# Patient Record
Sex: MALE | Race: WHITE | NOT HISPANIC OR LATINO
[De-identification: names, ages, dates, MRNs, and addresses within clinical notes are randomized per-mention and may not be internally consistent; named-entity substitution may affect disease eponyms.]

---

## 2017-03-23 ENCOUNTER — APPOINTMENT (OUTPATIENT)
Dept: HEART AND VASCULAR | Facility: CLINIC | Age: 82
End: 2017-03-23

## 2017-03-23 VITALS
HEART RATE: 72 BPM | SYSTOLIC BLOOD PRESSURE: 123 MMHG | HEIGHT: 71 IN | DIASTOLIC BLOOD PRESSURE: 77 MMHG | BODY MASS INDEX: 32.2 KG/M2 | WEIGHT: 230 LBS

## 2017-09-19 ENCOUNTER — APPOINTMENT (OUTPATIENT)
Dept: HEART AND VASCULAR | Facility: CLINIC | Age: 82
End: 2017-09-19
Payer: MEDICARE

## 2017-09-19 VITALS
HEIGHT: 71 IN | HEART RATE: 79 BPM | SYSTOLIC BLOOD PRESSURE: 103 MMHG | DIASTOLIC BLOOD PRESSURE: 63 MMHG | BODY MASS INDEX: 32.2 KG/M2 | WEIGHT: 230 LBS

## 2017-09-19 PROCEDURE — 93280 PM DEVICE PROGR EVAL DUAL: CPT

## 2018-03-27 ENCOUNTER — APPOINTMENT (OUTPATIENT)
Dept: HEART AND VASCULAR | Facility: CLINIC | Age: 83
End: 2018-03-27
Payer: MEDICARE

## 2018-03-27 VITALS
HEART RATE: 70 BPM | WEIGHT: 225 LBS | BODY MASS INDEX: 31.5 KG/M2 | HEIGHT: 71 IN | DIASTOLIC BLOOD PRESSURE: 62 MMHG | SYSTOLIC BLOOD PRESSURE: 114 MMHG

## 2018-03-27 PROCEDURE — 93280 PM DEVICE PROGR EVAL DUAL: CPT

## 2018-03-27 PROCEDURE — 99214 OFFICE O/P EST MOD 30 MIN: CPT | Mod: 25

## 2018-09-05 ENCOUNTER — LABORATORY RESULT (OUTPATIENT)
Age: 83
End: 2018-09-05

## 2018-09-05 ENCOUNTER — APPOINTMENT (OUTPATIENT)
Dept: HEART AND VASCULAR | Facility: CLINIC | Age: 83
End: 2018-09-05
Payer: MEDICARE

## 2018-09-05 ENCOUNTER — APPOINTMENT (OUTPATIENT)
Dept: HEART AND VASCULAR | Facility: CLINIC | Age: 83
End: 2018-09-05

## 2018-09-05 VITALS
BODY MASS INDEX: 31.64 KG/M2 | DIASTOLIC BLOOD PRESSURE: 80 MMHG | RESPIRATION RATE: 16 BRPM | WEIGHT: 226 LBS | SYSTOLIC BLOOD PRESSURE: 130 MMHG | HEIGHT: 71 IN | HEART RATE: 66 BPM

## 2018-09-05 DIAGNOSIS — Z78.9 OTHER SPECIFIED HEALTH STATUS: ICD-10-CM

## 2018-09-05 DIAGNOSIS — Z82.49 FAMILY HISTORY OF ISCHEMIC HEART DISEASE AND OTHER DISEASES OF THE CIRCULATORY SYSTEM: ICD-10-CM

## 2018-09-05 DIAGNOSIS — Z86.718 PERSONAL HISTORY OF OTHER VENOUS THROMBOSIS AND EMBOLISM: ICD-10-CM

## 2018-09-05 DIAGNOSIS — Z80.42 FAMILY HISTORY OF MALIGNANT NEOPLASM OF PROSTATE: ICD-10-CM

## 2018-09-05 PROCEDURE — 99214 OFFICE O/P EST MOD 30 MIN: CPT | Mod: 25

## 2018-09-05 PROCEDURE — 93000 ELECTROCARDIOGRAM COMPLETE: CPT

## 2018-09-05 PROCEDURE — 36415 COLL VENOUS BLD VENIPUNCTURE: CPT

## 2018-09-06 LAB
ALBUMIN SERPL ELPH-MCNC: 4.2 G/DL
ALP BLD-CCNC: 46 U/L
ALT SERPL-CCNC: 12 U/L
ANION GAP SERPL CALC-SCNC: 13 MMOL/L
AST SERPL-CCNC: 16 U/L
BASOPHILS # BLD AUTO: 0.04 K/UL
BASOPHILS NFR BLD AUTO: 0.5 %
BILIRUB SERPL-MCNC: 0.8 MG/DL
BUN SERPL-MCNC: 22 MG/DL
CALCIUM SERPL-MCNC: 8.8 MG/DL
CHLORIDE SERPL-SCNC: 103 MMOL/L
CHOLEST SERPL-MCNC: 102 MG/DL
CHOLEST/HDLC SERPL: 2.4 RATIO
CO2 SERPL-SCNC: 25 MMOL/L
CREAT SERPL-MCNC: 1.1 MG/DL
EOSINOPHIL # BLD AUTO: 0.21 K/UL
EOSINOPHIL NFR BLD AUTO: 2.8 %
GLUCOSE SERPL-MCNC: 85 MG/DL
HBA1C MFR BLD HPLC: 5.8 %
HCT VFR BLD CALC: 38.4 %
HDLC SERPL-MCNC: 42 MG/DL
HGB BLD-MCNC: 11.8 G/DL
IMM GRANULOCYTES NFR BLD AUTO: 0.4 %
LDLC SERPL CALC-MCNC: 48 MG/DL
LYMPHOCYTES # BLD AUTO: 1.82 K/UL
LYMPHOCYTES NFR BLD AUTO: 24.5 %
MAN DIFF?: NORMAL
MCHC RBC-ENTMCNC: 29.2 PG
MCHC RBC-ENTMCNC: 30.7 GM/DL
MCV RBC AUTO: 95 FL
MONOCYTES # BLD AUTO: 1.05 K/UL
MONOCYTES NFR BLD AUTO: 14.1 %
NEUTROPHILS # BLD AUTO: 4.28 K/UL
NEUTROPHILS NFR BLD AUTO: 57.7 %
PLATELET # BLD AUTO: 216 K/UL
POTASSIUM SERPL-SCNC: 4.5 MMOL/L
PROT SERPL-MCNC: 6.5 G/DL
RBC # BLD: 4.04 M/UL
RBC # FLD: 15.6 %
SODIUM SERPL-SCNC: 141 MMOL/L
T3 SERPL-MCNC: 108 NG/DL
T3FREE SERPL-MCNC: 2.81 PG/ML
T3RU NFR SERPL: 0.96 INDEX
T4 FREE SERPL-MCNC: 1.4 NG/DL
T4 SERPL-MCNC: 8.4 UG/DL
TRIGL SERPL-MCNC: 60 MG/DL
TSH SERPL-ACNC: 1.37 UIU/ML
WBC # FLD AUTO: 7.43 K/UL

## 2018-09-08 PROBLEM — Z80.42 FAMILY HISTORY OF MALIGNANT NEOPLASM OF PROSTATE: Status: ACTIVE | Noted: 2018-09-08

## 2018-09-08 PROBLEM — Z78.9 CONSUMES ALCOHOL AT SOCIAL EVENTS: Status: ACTIVE | Noted: 2018-09-08

## 2018-09-08 PROBLEM — Z82.49 FAMILY HISTORY OF CORONARY ARTERY DISEASE: Status: ACTIVE | Noted: 2018-09-08

## 2018-09-08 PROBLEM — Z82.49 FAMILY HISTORY OF HYPERTENSION: Status: ACTIVE | Noted: 2018-09-08

## 2018-09-08 RX ORDER — CHROMIUM 200 MCG
1000 TABLET ORAL
Refills: 0 | Status: ACTIVE | COMMUNITY

## 2018-09-20 ENCOUNTER — APPOINTMENT (OUTPATIENT)
Dept: HEART AND VASCULAR | Facility: CLINIC | Age: 83
End: 2018-09-20

## 2018-09-25 ENCOUNTER — APPOINTMENT (OUTPATIENT)
Dept: HEART AND VASCULAR | Facility: CLINIC | Age: 83
End: 2018-09-25

## 2018-09-26 ENCOUNTER — APPOINTMENT (OUTPATIENT)
Dept: HEART AND VASCULAR | Facility: CLINIC | Age: 83
End: 2018-09-26

## 2018-10-23 ENCOUNTER — APPOINTMENT (OUTPATIENT)
Dept: HEART AND VASCULAR | Facility: CLINIC | Age: 83
End: 2018-10-23
Payer: MEDICARE

## 2018-10-23 VITALS
WEIGHT: 223 LBS | HEART RATE: 91 BPM | SYSTOLIC BLOOD PRESSURE: 109 MMHG | DIASTOLIC BLOOD PRESSURE: 58 MMHG | BODY MASS INDEX: 31.22 KG/M2 | HEIGHT: 71 IN

## 2018-10-23 PROCEDURE — 93280 PM DEVICE PROGR EVAL DUAL: CPT

## 2018-10-23 RX ORDER — AMOXICILLIN AND CLAVULANATE POTASSIUM 500; 125 MG/1; MG/1
500-125 TABLET, FILM COATED ORAL 3 TIMES DAILY
Qty: 30 | Refills: 0 | Status: DISCONTINUED | COMMUNITY
Start: 2018-09-05 | End: 2018-10-23

## 2018-12-10 ENCOUNTER — RX RENEWAL (OUTPATIENT)
Age: 83
End: 2018-12-10

## 2018-12-12 ENCOUNTER — RX RENEWAL (OUTPATIENT)
Age: 83
End: 2018-12-12

## 2018-12-13 ENCOUNTER — RX RENEWAL (OUTPATIENT)
Age: 83
End: 2018-12-13

## 2019-01-23 ENCOUNTER — LABORATORY RESULT (OUTPATIENT)
Age: 84
End: 2019-01-23

## 2019-01-23 ENCOUNTER — APPOINTMENT (OUTPATIENT)
Dept: HEART AND VASCULAR | Facility: CLINIC | Age: 84
End: 2019-01-23
Payer: MEDICARE

## 2019-01-23 VITALS
DIASTOLIC BLOOD PRESSURE: 66 MMHG | RESPIRATION RATE: 14 BRPM | HEIGHT: 71 IN | SYSTOLIC BLOOD PRESSURE: 110 MMHG | BODY MASS INDEX: 30.94 KG/M2 | HEART RATE: 60 BPM | WEIGHT: 221 LBS

## 2019-01-23 PROCEDURE — 93000 ELECTROCARDIOGRAM COMPLETE: CPT

## 2019-01-23 PROCEDURE — 36415 COLL VENOUS BLD VENIPUNCTURE: CPT

## 2019-01-23 PROCEDURE — 99214 OFFICE O/P EST MOD 30 MIN: CPT

## 2019-01-23 NOTE — REASON FOR VISIT
[Follow-Up - Clinic] : a clinic follow-up of [Atrial Fibrillation] : atrial fibrillation [Cardiomyopathy] : cardiomyopathy [Coronary Artery Disease] : coronary artery disease [Hypertension] : hypertension

## 2019-01-23 NOTE — DISCUSSION/SUMMARY
[Atrial Fibrillation] : atrial fibrillation [Cardiomyopathy] : cardiomyopathy [Coronary Artery Disease] : coronary artery disease [Dietary Modification] : dietary modification [Weight Reduction] : weight reduction [Hypertension] : hypertension [Stable] : stable [None] : none [Sodium Restriction] : sodium restriction [Patient] : the patient [de-identified] : in paced rhythm [FreeTextEntry1] : Blood work drawn. Maintain a low caloric, low sodium, low cholesterol  diet. Dietary counseling given, diet and exercise discussed in detail, weight loss recommended.\par

## 2019-01-23 NOTE — HISTORY OF PRESENT ILLNESS
[FreeTextEntry1] : Denies Chest Pain, SOB, Dizziness, Leg edema, Orthopnea, PND, Palpitations, Syncope, DEWEY, Diaphoresis, unchanged clinical status\par

## 2019-01-23 NOTE — PHYSICAL EXAM
[General Appearance - Well Developed] : well developed [Normal Appearance] : normal appearance [Well Groomed] : well groomed [General Appearance - Well Nourished] : well nourished [No Deformities] : no deformities [General Appearance - In No Acute Distress] : no acute distress [Normal Conjunctiva] : the conjunctiva exhibited no abnormalities [Eyelids - No Xanthelasma] : the eyelids demonstrated no xanthelasmas [Normal Jugular Venous A Waves Present] : normal jugular venous A waves present [Normal Jugular Venous V Waves Present] : normal jugular venous V waves present [No Jugular Venous Herbert A Waves] : no jugular venous herbert A waves [Respiration, Rhythm And Depth] : normal respiratory rhythm and effort [Exaggerated Use Of Accessory Muscles For Inspiration] : no accessory muscle use [Auscultation Breath Sounds / Voice Sounds] : lungs were clear to auscultation bilaterally [Heart Rate And Rhythm] : heart rate and rhythm were normal [Heart Sounds] : normal S1 and S2 [Systolic grade ___/6] : A grade [unfilled]/6 systolic murmur was heard. [Diastolic Grade ___/4] : A grade [unfilled]/4 diastolic murmur was heard. [Abdomen Soft] : soft [Abdomen Tenderness] : non-tender [Abdomen Mass (___ Cm)] : no abdominal mass palpated [Abnormal Walk] : normal gait [Gait - Sufficient For Exercise Testing] : the gait was sufficient for exercise testing [Nail Clubbing] : no clubbing of the fingernails [Cyanosis, Localized] : no localized cyanosis [Petechial Hemorrhages (___cm)] : no petechial hemorrhages [Skin Color & Pigmentation] : normal skin color and pigmentation [] : no rash [No Venous Stasis] : no venous stasis [Skin Lesions] : no skin lesions [No Skin Ulcers] : no skin ulcer [No Xanthoma] : no  xanthoma was observed [Oriented To Time, Place, And Person] : oriented to person, place, and time [Affect] : the affect was normal [Mood] : the mood was normal [No Anxiety] : not feeling anxious [Normal Oral Mucosa] : normal oral mucosa [No Oral Pallor] : no oral pallor [No Oral Cyanosis] : no oral cyanosis

## 2019-01-24 LAB
ALBUMIN SERPL ELPH-MCNC: 4.3 G/DL
ALP BLD-CCNC: 45 U/L
ALT SERPL-CCNC: 18 U/L
ANION GAP SERPL CALC-SCNC: 12 MMOL/L
AST SERPL-CCNC: 21 U/L
BILIRUB SERPL-MCNC: 0.8 MG/DL
BUN SERPL-MCNC: 21 MG/DL
CALCIUM SERPL-MCNC: 9.2 MG/DL
CHLORIDE SERPL-SCNC: 106 MMOL/L
CHOLEST SERPL-MCNC: 118 MG/DL
CHOLEST/HDLC SERPL: 2.2 RATIO
CO2 SERPL-SCNC: 24 MMOL/L
CREAT SERPL-MCNC: 0.98 MG/DL
GLUCOSE SERPL-MCNC: 84 MG/DL
HBA1C MFR BLD HPLC: 5.7 %
HDLC SERPL-MCNC: 54 MG/DL
LDLC SERPL CALC-MCNC: 50 MG/DL
POTASSIUM SERPL-SCNC: 4.1 MMOL/L
PROT SERPL-MCNC: 6.5 G/DL
SODIUM SERPL-SCNC: 143 MMOL/L
TRIGL SERPL-MCNC: 72 MG/DL

## 2019-01-27 ENCOUNTER — RX RENEWAL (OUTPATIENT)
Age: 84
End: 2019-01-27

## 2019-02-04 LAB
BASOPHILS # BLD AUTO: 0.03 K/UL
BASOPHILS NFR BLD AUTO: 0.5 %
EOSINOPHIL # BLD AUTO: 0.29 K/UL
EOSINOPHIL NFR BLD AUTO: 4.7 %
HCT VFR BLD CALC: 40.5 %
HGB BLD-MCNC: 12.3 G/DL
IMM GRANULOCYTES NFR BLD AUTO: 0.3 %
LYMPHOCYTES # BLD AUTO: 1.7 K/UL
LYMPHOCYTES NFR BLD AUTO: 27.3 %
MAN DIFF?: NORMAL
MCHC RBC-ENTMCNC: 28.2 PG
MCHC RBC-ENTMCNC: 30.4 GM/DL
MCV RBC AUTO: 92.9 FL
MONOCYTES # BLD AUTO: 0.82 K/UL
MONOCYTES NFR BLD AUTO: 13.2 %
NEUTROPHILS # BLD AUTO: 3.37 K/UL
NEUTROPHILS NFR BLD AUTO: 54 %
PLATELET # BLD AUTO: 161 K/UL
RBC # BLD: 4.36 M/UL
RBC # FLD: 16.1 %
T3 SERPL-MCNC: 116 NG/DL
T3FREE SERPL-MCNC: 3.2 PG/ML
T3RU NFR SERPL: 1.08 INDEX
T4 FREE SERPL-MCNC: 1.4 NG/DL
T4 SERPL-MCNC: 8.7 UG/DL
TSH SERPL-ACNC: 2.45 UIU/ML
WBC # FLD AUTO: 6.23 K/UL

## 2019-04-09 ENCOUNTER — APPOINTMENT (OUTPATIENT)
Dept: HEART AND VASCULAR | Facility: CLINIC | Age: 84
End: 2019-04-09
Payer: MEDICARE

## 2019-04-09 VITALS
BODY MASS INDEX: 31.5 KG/M2 | HEART RATE: 80 BPM | HEIGHT: 71 IN | SYSTOLIC BLOOD PRESSURE: 137 MMHG | DIASTOLIC BLOOD PRESSURE: 64 MMHG | WEIGHT: 225 LBS

## 2019-04-09 PROCEDURE — 99213 OFFICE O/P EST LOW 20 MIN: CPT | Mod: 25

## 2019-04-09 PROCEDURE — 93280 PM DEVICE PROGR EVAL DUAL: CPT

## 2019-04-09 NOTE — END OF VISIT
[] : Nurse Practitioner [Time Spent: ___ minutes] : I have spent [unfilled] minutes of face to face time with the patient [>50% of Time Spent on Counseling for ____] : Greater than 50% of the encounter time was spent on counseling for [unfilled]

## 2019-04-12 NOTE — PHYSICAL EXAM
[General Appearance - Well Developed] : well developed [Normal Appearance] : normal appearance [General Appearance - Well Nourished] : well nourished [Well Groomed] : well groomed [No Deformities] : no deformities [General Appearance - In No Acute Distress] : no acute distress [] : no respiratory distress [Respiration, Rhythm And Depth] : normal respiratory rhythm and effort [Auscultation Breath Sounds / Voice Sounds] : lungs were clear to auscultation bilaterally [Left Infraclavicular] : left infraclavicular area [Exaggerated Use Of Accessory Muscles For Inspiration] : no accessory muscle use [Clean] : clean [Dry] : dry [Well-Healed] : well-healed [Cyanosis, Localized] : no localized cyanosis [FreeTextEntry1] : irregularly irregular -  [Palpable Crepitus] : no palpable crepitus [Bleeding] : no active bleeding [Purulent Drainage] : no purulent drainage [Foul Odor] : no foul smell [Serosanguineous Drainage] : no serosanquineous drainage [Serous Drainage] : no serous drainage [Erythema] : not erythematous [Tender] : not tender [Warm] : not warm [Fluctuant] : not fluctuant [Indurated] : not indurated

## 2019-04-12 NOTE — PROCEDURE
[Atrial Fibrillation] : atrial fibrillation [No] : not [Medtronic] : Medtronic [Pacemaker] : pacemaker [Voltage: ___ volts] : Voltage was [unfilled] volts [DDDR] : DDDR [Threshold Testing Performed] : Threshold testing was performed [Lead Imp:  ___ohms] : lead impedance was [unfilled] ohms [Sensing Amplitude ___mv] : sensing amplitude was [unfilled] mv [___V @] : [unfilled] V [___ ms] : [unfilled] ms [None] : none [Outputs/Safety Margin] : output changed to allow for adequate safety margin [Asense-Vpace ___ %] : Asense-Vpace [unfilled]% [Asense-Vsense ___ %] : Asense-Vsense [unfilled]% [Apace-Vsense ___ %] : Apace-Vsense [unfilled]% [Apace-Vpace ___ %] : Apace-Vpace [unfilled]% [de-identified] : no escape > 30 bpm  [de-identified] : Medtronic [de-identified] : [employer] [de-identified] : [id number] [de-identified] : 8/17/15 [de-identified] :  [de-identified] : 4 years [de-identified] : Persistent AT/AF since 11/2015\par

## 2019-04-12 NOTE — REVIEW OF SYSTEMS
[see HPI] : see HPI [Negative] : Neurological [Headache] : no headache [Fever] : no fever [Chills] : no chills [Feeling Fatigued] : not feeling fatigued

## 2019-04-12 NOTE — HISTORY OF PRESENT ILLNESS
[Palpitations] : no palpitations [SOB] : no dyspnea [Syncope] : no syncope [Dizziness] : no dizziness [Pain at Site] : no pain at device site [Chest Pain] : no chest pain or discomfort [Shoulder Pain] : no shoulder pain [Erythema at Site] : no erythema at device site [Swelling at Site] : no swelling at device site [de-identified] : Mr. 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  \par \par He feels well and offers no device related complaints.  Tolerating Xarelto without issues.  No chest pain, SOB, syncope or near syncope.

## 2019-05-06 ENCOUNTER — LABORATORY RESULT (OUTPATIENT)
Age: 84
End: 2019-05-06

## 2019-05-06 ENCOUNTER — APPOINTMENT (OUTPATIENT)
Dept: HEART AND VASCULAR | Facility: CLINIC | Age: 84
End: 2019-05-06
Payer: MEDICARE

## 2019-05-06 VITALS — SYSTOLIC BLOOD PRESSURE: 130 MMHG | DIASTOLIC BLOOD PRESSURE: 70 MMHG

## 2019-05-06 VITALS — HEIGHT: 71 IN | WEIGHT: 219 LBS | BODY MASS INDEX: 30.66 KG/M2

## 2019-05-06 DIAGNOSIS — J30.9 ALLERGIC RHINITIS, UNSPECIFIED: ICD-10-CM

## 2019-05-06 PROCEDURE — 99214 OFFICE O/P EST MOD 30 MIN: CPT

## 2019-05-06 NOTE — PHYSICAL EXAM
[No Acute Distress] : no acute distress [Well Nourished] : well nourished [Well Developed] : well developed [Well-Appearing] : well-appearing [Normal Sclera/Conjunctiva] : normal sclera/conjunctiva [PERRL] : pupils equal round and reactive to light [EOMI] : extraocular movements intact [Normal Oropharynx] : the oropharynx was normal [No JVD] : no jugular venous distention [Supple] : supple [No Lymphadenopathy] : no lymphadenopathy [Thyroid Normal, No Nodules] : the thyroid was normal and there were no nodules present [Clear to Auscultation] : lungs were clear to auscultation bilaterally [No Respiratory Distress] : no respiratory distress  [Normal Rate] : normal rate  [No Accessory Muscle Use] : no accessory muscle use [Regular Rhythm] : with a regular rhythm [Normal S1, S2] : normal S1 and S2 [No Murmur] : no murmur heard [No Carotid Bruits] : no carotid bruits [No Abdominal Bruit] : a ~M bruit was not heard ~T in the abdomen [No Varicosities] : no varicosities [No Edema] : there was no peripheral edema [Pedal Pulses Present] : the pedal pulses are present [Soft] : abdomen soft [No Extremity Clubbing/Cyanosis] : no extremity clubbing/cyanosis [No Palpable Aorta] : no palpable aorta [No Masses] : no abdominal mass palpated [Non Tender] : non-tender [Non-distended] : non-distended [Normal Bowel Sounds] : normal bowel sounds [No HSM] : no HSM [Normal Anterior Cervical Nodes] : no anterior cervical lymphadenopathy [No CVA Tenderness] : no CVA  tenderness [Normal Posterior Cervical Nodes] : no posterior cervical lymphadenopathy [No Joint Swelling] : no joint swelling [No Spinal Tenderness] : no spinal tenderness [Normal Gait] : normal gait [Grossly Normal Strength/Tone] : grossly normal strength/tone [No Rash] : no rash [Coordination Grossly Intact] : coordination grossly intact [Normal Affect] : the affect was normal [Deep Tendon Reflexes (DTR)] : deep tendon reflexes were 2+ and symmetric [No Focal Deficits] : no focal deficits [Normal Insight/Judgement] : insight and judgment were intact [de-identified] : moderate edma bilat nasal nares near obliteration on left side

## 2019-05-06 NOTE — HISTORY OF PRESENT ILLNESS
[FreeTextEntry8] : Patient is an 84-year-old white male with a pacemaker and paroxysmal atrial fibrillation on xarelto who presents with 2 days of significant nasal congestion with inability to pass ventilation through his nasal passages. He denies any fevers cough chills. He does have moderate rhinorrhea.

## 2019-05-06 NOTE — REVIEW OF SYSTEMS
[Postnasal Drip] : postnasal drip [Nasal Discharge] : nasal discharge [Negative] : Cardiovascular [Fever] : no fever [Chills] : no chills [Discharge] : no discharge [Vision Problems] : no vision problems [Earache] : no earache

## 2019-05-12 ENCOUNTER — MOBILE ON CALL (OUTPATIENT)
Age: 84
End: 2019-05-12

## 2019-05-12 LAB
A ALTERNATA IGE QN: <0.1 KUA/L
A FUMIGATUS IGE QN: 10.3 KUA/L
C ALBICANS IGE QN: <0.1 KUA/L
C HERBARUM IGE QN: <0.1 KUA/L
CAT DANDER IGE QN: <0.1 KUA/L
COMMON RAGWEED IGE QN: <0.1 KUA/L
D FARINAE IGE QN: <0.1 KUA/L
D PTERONYSS IGE QN: <0.1 KUA/L
DEPRECATED A ALTERNATA IGE RAST QL: 0
DEPRECATED A FUMIGATUS IGE RAST QL: 3
DEPRECATED C ALBICANS IGE RAST QL: 0
DEPRECATED C HERBARUM IGE RAST QL: 0
DEPRECATED CAT DANDER IGE RAST QL: 0
DEPRECATED COMMON RAGWEED IGE RAST QL: 0
DEPRECATED D FARINAE IGE RAST QL: 0
DEPRECATED D PTERONYSS IGE RAST QL: 0
DEPRECATED DOG DANDER IGE RAST QL: 0
DEPRECATED M RACEMOSUS IGE RAST QL: 0
DEPRECATED ROACH IGE RAST QL: 0
DEPRECATED TIMOTHY IGE RAST QL: 0
DEPRECATED WHITE OAK IGE RAST QL: 0
DOG DANDER IGE QN: <0.1 KUA/L
M RACEMOSUS IGE QN: <0.1 KUA/L
ROACH IGE QN: <0.1 KUA/L
TIMOTHY IGE QN: <0.1 KUA/L
WHITE OAK IGE QN: <0.1 KUA/L

## 2019-05-20 ENCOUNTER — RX RENEWAL (OUTPATIENT)
Age: 84
End: 2019-05-20

## 2019-05-21 ENCOUNTER — APPOINTMENT (OUTPATIENT)
Dept: HEART AND VASCULAR | Facility: CLINIC | Age: 84
End: 2019-05-21

## 2019-05-28 ENCOUNTER — APPOINTMENT (OUTPATIENT)
Dept: HEART AND VASCULAR | Facility: CLINIC | Age: 84
End: 2019-05-28

## 2019-08-19 ENCOUNTER — RX RENEWAL (OUTPATIENT)
Age: 84
End: 2019-08-19

## 2019-10-31 ENCOUNTER — RX RENEWAL (OUTPATIENT)
Age: 84
End: 2019-10-31

## 2019-11-26 ENCOUNTER — APPOINTMENT (OUTPATIENT)
Dept: HEART AND VASCULAR | Facility: CLINIC | Age: 84
End: 2019-11-26
Payer: MEDICARE

## 2019-11-26 VITALS
DIASTOLIC BLOOD PRESSURE: 67 MMHG | SYSTOLIC BLOOD PRESSURE: 143 MMHG | HEIGHT: 71 IN | BODY MASS INDEX: 30.66 KG/M2 | HEART RATE: 84 BPM | WEIGHT: 219 LBS

## 2019-11-26 PROCEDURE — 99215 OFFICE O/P EST HI 40 MIN: CPT | Mod: 25

## 2019-11-26 PROCEDURE — 93280 PM DEVICE PROGR EVAL DUAL: CPT

## 2019-11-26 NOTE — PROCEDURE
[No] : not [Pacemaker] : pacemaker [Medtronic] : Medtronic [Atrial Fibrillation] : atrial fibrillation [Voltage: ___ volts] : Voltage was [unfilled] volts [DDDR] : DDDR [Threshold Testing Performed] : Threshold testing was performed [Lead Imp:  ___ohms] : lead impedance was [unfilled] ohms [Sensing Amplitude ___mv] : sensing amplitude was [unfilled] mv [___V @] : [unfilled] V [___ ms] : [unfilled] ms [Asense-Vsense ___ %] : Asense-Vsense [unfilled]% [None] : none [Outputs/Safety Margin] : output changed to allow for adequate safety margin [Apace-Vsense ___ %] : Apace-Vsense [unfilled]% [Asense-Vpace ___ %] : Asense-Vpace [unfilled]% [Apace-Vpace ___ %] : Apace-Vpace [unfilled]% [de-identified] : no escape > 30 bpm  [de-identified] : Medtronic [de-identified] : [employer] [de-identified] : [id number] [de-identified] :  [de-identified] : 8/17/15 [de-identified] : Persistent AT/AF since 11/2015\par  99.8% [de-identified] : 2 years

## 2019-11-26 NOTE — HISTORY OF PRESENT ILLNESS
[Palpitations] : no palpitations [Syncope] : no syncope [SOB] : no dyspnea [Dizziness] : no dizziness [Chest Pain] : no chest pain or discomfort [Shoulder Pain] : no shoulder pain [Erythema at Site] : no erythema at device site [Pain at Site] : no pain at device site [Swelling at Site] : no swelling at device site [de-identified] : Mr. 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  \par \par He feels well and offers no device related complaints.  No chest pain, SOB, syncope or near syncope.  \par \par Reports he stopped Xarelto 6 months ago and started ASA because he doesn't want to be "attached to the medicine".

## 2019-11-26 NOTE — PHYSICAL EXAM
[General Appearance - Well Developed] : well developed [Well Groomed] : well groomed [Normal Appearance] : normal appearance [General Appearance - Well Nourished] : well nourished [No Deformities] : no deformities [General Appearance - In No Acute Distress] : no acute distress [] : no respiratory distress [Exaggerated Use Of Accessory Muscles For Inspiration] : no accessory muscle use [Respiration, Rhythm And Depth] : normal respiratory rhythm and effort [Left Infraclavicular] : left infraclavicular area [Auscultation Breath Sounds / Voice Sounds] : lungs were clear to auscultation bilaterally [Dry] : dry [Clean] : clean [Well-Healed] : well-healed [Cyanosis, Localized] : no localized cyanosis [FreeTextEntry1] : irregularly irregular -  [Bleeding] : no active bleeding [Palpable Crepitus] : no palpable crepitus [Purulent Drainage] : no purulent drainage [Foul Odor] : no foul smell [Serosanguineous Drainage] : no serosanquineous drainage [Erythema] : not erythematous [Serous Drainage] : no serous drainage [Warm] : not warm [Tender] : not tender [Fluctuant] : not fluctuant [Indurated] : not indurated

## 2020-03-31 RX ORDER — AZITHROMYCIN 250 MG/1
250 TABLET, FILM COATED ORAL
Qty: 1 | Refills: 0 | Status: DISCONTINUED | COMMUNITY
Start: 2019-05-06 | End: 2020-03-31

## 2020-04-27 ENCOUNTER — RX RENEWAL (OUTPATIENT)
Age: 85
End: 2020-04-27

## 2020-06-09 ENCOUNTER — APPOINTMENT (OUTPATIENT)
Dept: HEART AND VASCULAR | Facility: CLINIC | Age: 85
End: 2020-06-09
Payer: MEDICARE

## 2020-06-09 PROCEDURE — 99442: CPT

## 2020-06-11 NOTE — HISTORY OF PRESENT ILLNESS
[Home] : at home, [unfilled] , at the time of the visit. [Other Location: e.g. Home (Enter Location, City,State)___] : at [unfilled] [Verbal consent obtained from patient] : the patient, [unfilled] [None] : The patient complains of no symptoms [Palpitations] : no palpitations [SOB] : no dyspnea [Syncope] : no syncope [Dizziness] : no dizziness [Chest Pain] : no chest pain or discomfort [Shoulder Pain] : no shoulder pain [Pain at Site] : no pain at device site [Erythema at Site] : no erythema at device site [Swelling at Site] : no swelling at device site [de-identified] : Due to the global COVID-19 pandemic and the recommendations for social distancing, this encounter was performed using the [email or web address] audio/video platform.  All components of the evaluation and management were performed per clinical routine with the exception of the in-person physical exam.  If significant physical exam information was provided by the patient or noted by visual inspection on the video portion, it was included in this encounter.  Other available physiologic and diagnostic data were reviewed in detail, (e.g. remote monitoring reports, ambulatory vitals, results of prior testing).  Verbal consent was obtained before proceeding with this encounter.\par \par Mr. 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  \par \par He feels well and offers no device related complaints.  No chest pain, SOB, syncope or near syncope.  \par \par Reports he had stopped Xarelto months ago and started ASA because he doesn't want to be "attached to the medicine".   We subsequently put hi on eliquis without the aspirin.  He is tolerating this.\par \par He has not had CARELINK.

## 2020-07-16 ENCOUNTER — NON-APPOINTMENT (OUTPATIENT)
Age: 85
End: 2020-07-16

## 2020-07-16 ENCOUNTER — APPOINTMENT (OUTPATIENT)
Dept: HEART AND VASCULAR | Facility: CLINIC | Age: 85
End: 2020-07-16
Payer: MEDICARE

## 2020-07-16 VITALS
BODY MASS INDEX: 29.96 KG/M2 | HEART RATE: 68 BPM | DIASTOLIC BLOOD PRESSURE: 75 MMHG | SYSTOLIC BLOOD PRESSURE: 135 MMHG | HEIGHT: 71 IN | WEIGHT: 214 LBS

## 2020-07-16 DIAGNOSIS — Z00.00 ENCOUNTER FOR GENERAL ADULT MEDICAL EXAMINATION W/OUT ABNORMAL FINDINGS: ICD-10-CM

## 2020-07-16 DIAGNOSIS — Z87.09 PERSONAL HISTORY OF OTHER DISEASES OF THE RESPIRATORY SYSTEM: ICD-10-CM

## 2020-07-16 PROCEDURE — 36415 COLL VENOUS BLD VENIPUNCTURE: CPT

## 2020-07-16 PROCEDURE — 93000 ELECTROCARDIOGRAM COMPLETE: CPT

## 2020-07-16 PROCEDURE — 93306 TTE W/DOPPLER COMPLETE: CPT

## 2020-07-16 PROCEDURE — 99214 OFFICE O/P EST MOD 30 MIN: CPT

## 2020-07-16 PROCEDURE — 93880 EXTRACRANIAL BILAT STUDY: CPT

## 2020-07-16 NOTE — PHYSICAL EXAM
[Well Groomed] : well groomed [Normal Appearance] : normal appearance [General Appearance - Well Developed] : well developed [No Deformities] : no deformities [General Appearance - In No Acute Distress] : no acute distress [General Appearance - Well Nourished] : well nourished [Normal Oral Mucosa] : normal oral mucosa [Eyelids - No Xanthelasma] : the eyelids demonstrated no xanthelasmas [Normal Conjunctiva] : the conjunctiva exhibited no abnormalities [Normal Jugular Venous A Waves Present] : normal jugular venous A waves present [No Oral Pallor] : no oral pallor [No Oral Cyanosis] : no oral cyanosis [Normal Jugular Venous V Waves Present] : normal jugular venous V waves present [No Jugular Venous Herbert A Waves] : no jugular venous herbert A waves [Auscultation Breath Sounds / Voice Sounds] : lungs were clear to auscultation bilaterally [Exaggerated Use Of Accessory Muscles For Inspiration] : no accessory muscle use [Respiration, Rhythm And Depth] : normal respiratory rhythm and effort [Diastolic Grade ___/4] : A grade [unfilled]/4 diastolic murmur was heard. [Systolic grade ___/6] : A grade [unfilled]/6 systolic murmur was heard. [Heart Sounds] : normal S1 and S2 [Heart Rate And Rhythm] : heart rate and rhythm were normal [Abdomen Soft] : soft [Abdomen Tenderness] : non-tender [Abnormal Walk] : normal gait [Abdomen Mass (___ Cm)] : no abdominal mass palpated [Gait - Sufficient For Exercise Testing] : the gait was sufficient for exercise testing [Petechial Hemorrhages (___cm)] : no petechial hemorrhages [Nail Clubbing] : no clubbing of the fingernails [Cyanosis, Localized] : no localized cyanosis [No Venous Stasis] : no venous stasis [] : no rash [Skin Color & Pigmentation] : normal skin color and pigmentation [No Xanthoma] : no  xanthoma was observed [Skin Lesions] : no skin lesions [No Skin Ulcers] : no skin ulcer [Affect] : the affect was normal [No Anxiety] : not feeling anxious [Oriented To Time, Place, And Person] : oriented to person, place, and time [Mood] : the mood was normal

## 2020-07-16 NOTE — HISTORY OF PRESENT ILLNESS
[FreeTextEntry1] : Denies Chest Pain, SOB, Dizziness, Leg edema, Orthopnea, PND, Palpitations, Syncope, DEWEY, Diaphoresis.\par Echo ordered as routine surveillance (>1yr.) of moderate or severe valvular regurgitation without change in clinical status or cardiac exam (Echo AUC criteria -J.Am Soc of Echo 2011: 24:236)\par

## 2020-07-16 NOTE — DISCUSSION/SUMMARY
[Atrial Fibrillation] : atrial fibrillation [Cardiomyopathy] : cardiomyopathy [Dietary Modification] : dietary modification [Coronary Artery Disease] : coronary artery disease [Hypertension] : hypertension [Mitral Regurgitation] : mitral regurgitation [Stable] : stable [None] : none [Sodium Restriction] : sodium restriction [Patient] : the patient [de-identified] : in Paced rhythm [FreeTextEntry1] : PPM- interrogation ordered.\par Carotid artery disease- Stable; no further intervention at this time.\par Blood work drawn. Maintain a low caloric, low sodium, low cholesterol  diet. Dietary counseling given, diet and exercise discussed in detail.

## 2020-07-16 NOTE — REASON FOR VISIT
[Follow-Up - Clinic] : a clinic follow-up of [Atrial Fibrillation] : atrial fibrillation [Cardiomyopathy] : cardiomyopathy [Hypertension] : hypertension [Mitral Regurgitation] : mitral regurgitation [Coronary Artery Disease] : coronary artery disease [Pacemaker Evaluation] : pacemaker ~T evaluation ~C was performed [FreeTextEntry1] : known carotid artery disease.

## 2020-07-17 ENCOUNTER — LABORATORY RESULT (OUTPATIENT)
Age: 85
End: 2020-07-17

## 2020-07-21 LAB
25(OH)D3 SERPL-MCNC: 26.7 NG/ML
ALBUMIN SERPL ELPH-MCNC: 4.7 G/DL
ALP BLD-CCNC: 44 U/L
ALT SERPL-CCNC: 14 U/L
ANION GAP SERPL CALC-SCNC: 14 MMOL/L
AST SERPL-CCNC: 19 U/L
BASOPHILS # BLD AUTO: 0.1 K/UL
BASOPHILS NFR BLD AUTO: 1.4 %
BILIRUB SERPL-MCNC: 0.9 MG/DL
BUN SERPL-MCNC: 23 MG/DL
CALCIUM SERPL-MCNC: 9.6 MG/DL
CHLORIDE SERPL-SCNC: 101 MMOL/L
CHOLEST SERPL-MCNC: 126 MG/DL
CHOLEST/HDLC SERPL: 2.2 RATIO
CO2 SERPL-SCNC: 26 MMOL/L
CREAT SERPL-MCNC: 1.04 MG/DL
EOSINOPHIL # BLD AUTO: 0.14 K/UL
EOSINOPHIL NFR BLD AUTO: 2 %
ESTIMATED AVERAGE GLUCOSE: 111 MG/DL
GLUCOSE SERPL-MCNC: 126 MG/DL
HBA1C MFR BLD HPLC: 5.5 %
HCT VFR BLD CALC: 39.5 %
HDLC SERPL-MCNC: 56 MG/DL
HGB BLD-MCNC: 12 G/DL
IMM GRANULOCYTES NFR BLD AUTO: 0.3 %
LDLC SERPL CALC-MCNC: 56 MG/DL
LYMPHOCYTES # BLD AUTO: 2.3 K/UL
LYMPHOCYTES NFR BLD AUTO: 33.1 %
MAN DIFF?: NORMAL
MCHC RBC-ENTMCNC: 28.6 PG
MCHC RBC-ENTMCNC: 30.4 GM/DL
MCV RBC AUTO: 94.3 FL
MONOCYTES # BLD AUTO: 0.61 K/UL
MONOCYTES NFR BLD AUTO: 8.8 %
NEUTROPHILS # BLD AUTO: 3.77 K/UL
NEUTROPHILS NFR BLD AUTO: 54.4 %
PLATELET # BLD AUTO: 140 K/UL
POTASSIUM SERPL-SCNC: 4.4 MMOL/L
PROT SERPL-MCNC: 6.8 G/DL
RBC # BLD: 4.19 M/UL
RBC # FLD: 15.4 %
SARS-COV-2 IGG SERPL IA-ACNC: 67.7 AU/ML
SARS-COV-2 IGG SERPL QL IA: POSITIVE
SODIUM SERPL-SCNC: 141 MMOL/L
T3 SERPL-MCNC: 109 NG/DL
T3FREE SERPL-MCNC: 2.69 PG/ML
T3RU NFR SERPL: 1.1 TBI
T4 FREE SERPL-MCNC: 1.3 NG/DL
T4 SERPL-MCNC: 7.6 UG/DL
TRIGL SERPL-MCNC: 68 MG/DL
TSH SERPL-ACNC: 1.36 UIU/ML
WBC # FLD AUTO: 6.94 K/UL

## 2021-01-07 ENCOUNTER — LABORATORY RESULT (OUTPATIENT)
Age: 86
End: 2021-01-07

## 2021-01-07 ENCOUNTER — APPOINTMENT (OUTPATIENT)
Dept: HEART AND VASCULAR | Facility: CLINIC | Age: 86
End: 2021-01-07
Payer: MEDICARE

## 2021-01-07 ENCOUNTER — NON-APPOINTMENT (OUTPATIENT)
Age: 86
End: 2021-01-07

## 2021-01-07 VITALS
DIASTOLIC BLOOD PRESSURE: 82 MMHG | HEART RATE: 75 BPM | WEIGHT: 210 LBS | HEIGHT: 71 IN | RESPIRATION RATE: 14 BRPM | SYSTOLIC BLOOD PRESSURE: 148 MMHG | BODY MASS INDEX: 29.4 KG/M2

## 2021-01-07 PROCEDURE — 36415 COLL VENOUS BLD VENIPUNCTURE: CPT

## 2021-01-07 PROCEDURE — 99072 ADDL SUPL MATRL&STAF TM PHE: CPT

## 2021-01-07 PROCEDURE — 99214 OFFICE O/P EST MOD 30 MIN: CPT

## 2021-01-07 PROCEDURE — 93000 ELECTROCARDIOGRAM COMPLETE: CPT

## 2021-01-07 RX ORDER — FLUTICASONE PROPIONATE 50 UG/1
50 SPRAY, METERED NASAL
Qty: 48 | Refills: 0 | Status: DISCONTINUED | COMMUNITY
Start: 2019-05-06 | End: 2021-01-07

## 2021-01-07 NOTE — HISTORY OF PRESENT ILLNESS
[FreeTextEntry1] : Denies Chest Pain, SOB, Dizziness, Leg edema, Orthopnea, PND, Palpitations, Syncope, DEWEY, Diaphoresis.\par

## 2021-01-07 NOTE — DISCUSSION/SUMMARY
[Atrial Fibrillation] : atrial fibrillation [Cardiomyopathy] : cardiomyopathy [Coronary Artery Disease] : coronary artery disease [Dietary Modification] : dietary modification [Hypertension] : hypertension [Mitral Regurgitation] : mitral regurgitation [Stable] : stable [None] : none [Sodium Restriction] : sodium restriction [Patient] : the patient [de-identified] : in Paced rhythm [FreeTextEntry1] : Blood work drawn. Maintain a low caloric, low sodium, low cholesterol  diet. Dietary counseling given, diet and exercise discussed in detail.

## 2021-01-07 NOTE — PHYSICAL EXAM
[General Appearance - Well Developed] : well developed [Normal Appearance] : normal appearance [Well Groomed] : well groomed [General Appearance - Well Nourished] : well nourished [No Deformities] : no deformities [General Appearance - In No Acute Distress] : no acute distress [Normal Conjunctiva] : the conjunctiva exhibited no abnormalities [Eyelids - No Xanthelasma] : the eyelids demonstrated no xanthelasmas [Normal Oral Mucosa] : normal oral mucosa [No Oral Pallor] : no oral pallor [No Oral Cyanosis] : no oral cyanosis [Normal Jugular Venous A Waves Present] : normal jugular venous A waves present [Normal Jugular Venous V Waves Present] : normal jugular venous V waves present [No Jugular Venous Herbert A Waves] : no jugular venous herbert A waves [Respiration, Rhythm And Depth] : normal respiratory rhythm and effort [Exaggerated Use Of Accessory Muscles For Inspiration] : no accessory muscle use [Auscultation Breath Sounds / Voice Sounds] : lungs were clear to auscultation bilaterally [Heart Rate And Rhythm] : heart rate and rhythm were normal [Heart Sounds] : normal S1 and S2 [Systolic grade ___/6] : A grade [unfilled]/6 systolic murmur was heard. [Diastolic Grade ___/4] : A grade [unfilled]/4 diastolic murmur was heard. [Abdomen Soft] : soft [Abdomen Tenderness] : non-tender [Abdomen Mass (___ Cm)] : no abdominal mass palpated [Abnormal Walk] : normal gait [Gait - Sufficient For Exercise Testing] : the gait was sufficient for exercise testing [Nail Clubbing] : no clubbing of the fingernails [Cyanosis, Localized] : no localized cyanosis [Petechial Hemorrhages (___cm)] : no petechial hemorrhages [Skin Color & Pigmentation] : normal skin color and pigmentation [] : no rash [No Venous Stasis] : no venous stasis [Skin Lesions] : no skin lesions [No Skin Ulcers] : no skin ulcer [No Xanthoma] : no  xanthoma was observed [Oriented To Time, Place, And Person] : oriented to person, place, and time [Affect] : the affect was normal [Mood] : the mood was normal [No Anxiety] : not feeling anxious

## 2021-01-07 NOTE — REASON FOR VISIT
[Follow-Up - Clinic] : a clinic follow-up of [Atrial Fibrillation] : atrial fibrillation [Cardiomyopathy] : cardiomyopathy [Coronary Artery Disease] : coronary artery disease [Hypertension] : hypertension [Mitral Regurgitation] : mitral regurgitation

## 2021-01-19 LAB
ALBUMIN SERPL ELPH-MCNC: 4.2 G/DL
ALP BLD-CCNC: 46 U/L
ALT SERPL-CCNC: 11 U/L
ANION GAP SERPL CALC-SCNC: 10 MMOL/L
AST SERPL-CCNC: 15 U/L
BASOPHILS # BLD AUTO: 0.07 K/UL
BASOPHILS NFR BLD AUTO: 1.2 %
BILIRUB SERPL-MCNC: 0.6 MG/DL
BUN SERPL-MCNC: 23 MG/DL
CALCIUM SERPL-MCNC: 9 MG/DL
CHLORIDE SERPL-SCNC: 105 MMOL/L
CHOLEST SERPL-MCNC: 153 MG/DL
CO2 SERPL-SCNC: 26 MMOL/L
CREAT SERPL-MCNC: 1.05 MG/DL
EOSINOPHIL # BLD AUTO: 0.28 K/UL
EOSINOPHIL NFR BLD AUTO: 4.6 %
ESTIMATED AVERAGE GLUCOSE: 105 MG/DL
GLUCOSE SERPL-MCNC: 92 MG/DL
HBA1C MFR BLD HPLC: 5.3 %
HCT VFR BLD CALC: 38.2 %
HDLC SERPL-MCNC: 57 MG/DL
HGB BLD-MCNC: 12 G/DL
IMM GRANULOCYTES NFR BLD AUTO: 0.3 %
LDLC SERPL CALC-MCNC: 87 MG/DL
LYMPHOCYTES # BLD AUTO: 2.05 K/UL
LYMPHOCYTES NFR BLD AUTO: 34 %
MAN DIFF?: NORMAL
MCHC RBC-ENTMCNC: 29.2 PG
MCHC RBC-ENTMCNC: 31.4 GM/DL
MCV RBC AUTO: 92.9 FL
MONOCYTES # BLD AUTO: 0.69 K/UL
MONOCYTES NFR BLD AUTO: 11.4 %
NEUTROPHILS # BLD AUTO: 2.92 K/UL
NEUTROPHILS NFR BLD AUTO: 48.5 %
NONHDLC SERPL-MCNC: 96 MG/DL
PLATELET # BLD AUTO: 138 K/UL
POTASSIUM SERPL-SCNC: 4.4 MMOL/L
PROT SERPL-MCNC: 6.5 G/DL
RBC # BLD: 4.11 M/UL
RBC # FLD: 14.8 %
SODIUM SERPL-SCNC: 142 MMOL/L
T3 SERPL-MCNC: 106 NG/DL
T3FREE SERPL-MCNC: 2.83 PG/ML
T3RU NFR SERPL: 1 TBI
T4 FREE SERPL-MCNC: 1.3 NG/DL
T4 SERPL-MCNC: 7 UG/DL
TRIGL SERPL-MCNC: 49 MG/DL
TSH SERPL-ACNC: 2.22 UIU/ML
WBC # FLD AUTO: 6.03 K/UL

## 2021-07-14 ENCOUNTER — RX RENEWAL (OUTPATIENT)
Age: 86
End: 2021-07-14

## 2021-07-16 ENCOUNTER — RX RENEWAL (OUTPATIENT)
Age: 86
End: 2021-07-16

## 2021-08-10 ENCOUNTER — LABORATORY RESULT (OUTPATIENT)
Age: 86
End: 2021-08-10

## 2021-08-10 ENCOUNTER — APPOINTMENT (OUTPATIENT)
Dept: HEART AND VASCULAR | Facility: CLINIC | Age: 86
End: 2021-08-10
Payer: MEDICARE

## 2021-08-10 ENCOUNTER — NON-APPOINTMENT (OUTPATIENT)
Age: 86
End: 2021-08-10

## 2021-08-10 VITALS
RESPIRATION RATE: 14 BRPM | HEIGHT: 71 IN | BODY MASS INDEX: 29.54 KG/M2 | SYSTOLIC BLOOD PRESSURE: 120 MMHG | WEIGHT: 211 LBS | DIASTOLIC BLOOD PRESSURE: 70 MMHG | HEART RATE: 63 BPM

## 2021-08-10 PROCEDURE — 93880 EXTRACRANIAL BILAT STUDY: CPT

## 2021-08-10 PROCEDURE — 36415 COLL VENOUS BLD VENIPUNCTURE: CPT

## 2021-08-10 PROCEDURE — 93000 ELECTROCARDIOGRAM COMPLETE: CPT

## 2021-08-10 PROCEDURE — 99214 OFFICE O/P EST MOD 30 MIN: CPT

## 2021-08-10 PROCEDURE — 93306 TTE W/DOPPLER COMPLETE: CPT

## 2021-08-11 LAB
25(OH)D3 SERPL-MCNC: 54.6 NG/ML
ALBUMIN SERPL ELPH-MCNC: 4.3 G/DL
ALP BLD-CCNC: 43 U/L
ALT SERPL-CCNC: 17 U/L
ANION GAP SERPL CALC-SCNC: 10 MMOL/L
AST SERPL-CCNC: 19 U/L
BASOPHILS # BLD AUTO: 0.08 K/UL
BASOPHILS NFR BLD AUTO: 1 %
BILIRUB SERPL-MCNC: 0.7 MG/DL
BUN SERPL-MCNC: 22 MG/DL
CALCIUM SERPL-MCNC: 9.7 MG/DL
CHLORIDE SERPL-SCNC: 103 MMOL/L
CHOLEST SERPL-MCNC: 123 MG/DL
CO2 SERPL-SCNC: 28 MMOL/L
CREAT SERPL-MCNC: 1.06 MG/DL
EOSINOPHIL # BLD AUTO: 0.25 K/UL
EOSINOPHIL NFR BLD AUTO: 3 %
ESTIMATED AVERAGE GLUCOSE: 114 MG/DL
FOLATE SERPL-MCNC: >20 NG/ML
GLUCOSE SERPL-MCNC: 78 MG/DL
HBA1C MFR BLD HPLC: 5.6 %
HCT VFR BLD CALC: 36.8 %
HDLC SERPL-MCNC: 57 MG/DL
HGB BLD-MCNC: 11.8 G/DL
IMM GRANULOCYTES NFR BLD AUTO: 0.2 %
LDLC SERPL CALC-MCNC: 56 MG/DL
LYMPHOCYTES # BLD AUTO: 2.88 K/UL
LYMPHOCYTES NFR BLD AUTO: 35.1 %
MAN DIFF?: NORMAL
MCHC RBC-ENTMCNC: 29.8 PG
MCHC RBC-ENTMCNC: 32.1 GM/DL
MCV RBC AUTO: 92.9 FL
MONOCYTES # BLD AUTO: 0.87 K/UL
MONOCYTES NFR BLD AUTO: 10.6 %
NEUTROPHILS # BLD AUTO: 4.11 K/UL
NEUTROPHILS NFR BLD AUTO: 50.1 %
NONHDLC SERPL-MCNC: 66 MG/DL
PLATELET # BLD AUTO: 166 K/UL
POTASSIUM SERPL-SCNC: 4.3 MMOL/L
PROT SERPL-MCNC: 6.5 G/DL
RBC # BLD: 3.96 M/UL
RBC # FLD: 16.2 %
SODIUM SERPL-SCNC: 140 MMOL/L
T3 SERPL-MCNC: 107 NG/DL
T3FREE SERPL-MCNC: 2.76 PG/ML
T3RU NFR SERPL: 1 TBI
T4 FREE SERPL-MCNC: 1.3 NG/DL
T4 SERPL-MCNC: 7.7 UG/DL
TRIGL SERPL-MCNC: 50 MG/DL
TSH SERPL-ACNC: 1.65 UIU/ML
VIT B12 SERPL-MCNC: 751 PG/ML
WBC # FLD AUTO: 8.21 K/UL

## 2021-08-13 NOTE — HISTORY OF PRESENT ILLNESS
[FreeTextEntry1] : Denies Chest Pain, SOB, Dizziness, Leg edema, Orthopnea, PND, Palpitations, Syncope, DEWEY, Diaphoresis.\par Cardiomyopathy/ mitral regurgitation/ CAD-  Echo ordered to assess LV function/possible progression valvular heart disease.\par

## 2021-08-13 NOTE — REASON FOR VISIT
[Symptom and Test Evaluation] : symptom and test evaluation [Hypertension] : hypertension [Coronary Artery Disease] : coronary artery disease [Other: ____] : [unfilled]

## 2021-08-13 NOTE — DISCUSSION/SUMMARY
[Atrial Fibrillation] : atrial fibrillation [Cardiomyopathy] : cardiomyopathy [Coronary Artery Disease] : coronary artery disease [Dietary Modification] : dietary modification [Hypertension] : hypertension [Low Sodium Diet] : low sodium diet [NSAIDs Avoidance] : non-steroidal anti-inflammatory drugs avoidance [Mitral Regurgitation] : mitral regurgitation [Stable] : stable [None] : There are no changes in medication management [Sodium Restriction] : sodium restriction [Patient] : the patient [de-identified] : in Paced rhythm [FreeTextEntry1] : Carotid artery disease- Stable; no further intervention at this time.\par Blood work drawn. Maintain a low caloric, low sodium, low cholesterol  diet. Dietary counseling given, diet and exercise discussed in detail.

## 2021-08-13 NOTE — PHYSICAL EXAM
[Well Developed] : well developed [Well Nourished] : well nourished [No Acute Distress] : no acute distress [Normal Conjunctiva] : normal conjunctiva [Normal Venous Pressure] : normal venous pressure [Normal S1, S2] : normal S1, S2 [No Rub] : no rub [No Gallop] : no gallop [Clear Lung Fields] : clear lung fields [Good Air Entry] : good air entry [No Respiratory Distress] : no respiratory distress  [Soft] : abdomen soft [Non Tender] : non-tender [No Masses/organomegaly] : no masses/organomegaly [Normal Bowel Sounds] : normal bowel sounds [Normal Gait] : normal gait [No Edema] : no edema [No Cyanosis] : no cyanosis [No Clubbing] : no clubbing [No Varicosities] : no varicosities [No Rash] : no rash [No Skin Lesions] : no skin lesions [Moves all extremities] : moves all extremities [No Focal Deficits] : no focal deficits [Normal Speech] : normal speech [Alert and Oriented] : alert and oriented [Normal memory] : normal memory [de-identified] : 3/6 TAMMY-> Axilla 2/4 Diastolic murmur

## 2021-11-10 DIAGNOSIS — M54.9 DORSALGIA, UNSPECIFIED: ICD-10-CM

## 2022-01-01 ENCOUNTER — NON-APPOINTMENT (OUTPATIENT)
Age: 87
End: 2022-01-01

## 2022-01-01 ENCOUNTER — APPOINTMENT (OUTPATIENT)
Dept: HEART AND VASCULAR | Facility: CLINIC | Age: 87
End: 2022-01-01
Payer: MEDICARE

## 2022-01-01 PROCEDURE — 93294 REM INTERROG EVL PM/LDLS PM: CPT

## 2022-01-01 PROCEDURE — 93296 REM INTERROG EVL PM/IDS: CPT

## 2022-03-07 ENCOUNTER — LABORATORY RESULT (OUTPATIENT)
Age: 87
End: 2022-03-07

## 2022-03-07 ENCOUNTER — APPOINTMENT (OUTPATIENT)
Dept: HEART AND VASCULAR | Facility: CLINIC | Age: 87
End: 2022-03-07
Payer: MEDICARE

## 2022-03-07 ENCOUNTER — NON-APPOINTMENT (OUTPATIENT)
Age: 87
End: 2022-03-07

## 2022-03-07 VITALS
HEIGHT: 71 IN | HEART RATE: 66 BPM | RESPIRATION RATE: 14 BRPM | WEIGHT: 200 LBS | SYSTOLIC BLOOD PRESSURE: 110 MMHG | DIASTOLIC BLOOD PRESSURE: 70 MMHG | BODY MASS INDEX: 28 KG/M2

## 2022-03-07 DIAGNOSIS — F41.9 ANXIETY DISORDER, UNSPECIFIED: ICD-10-CM

## 2022-03-07 DIAGNOSIS — F32.A ANXIETY DISORDER, UNSPECIFIED: ICD-10-CM

## 2022-03-07 PROCEDURE — 93289 INTERROG DEVICE EVAL HEART: CPT

## 2022-03-07 PROCEDURE — 36415 COLL VENOUS BLD VENIPUNCTURE: CPT

## 2022-03-07 PROCEDURE — 93000 ELECTROCARDIOGRAM COMPLETE: CPT

## 2022-03-07 PROCEDURE — 99214 OFFICE O/P EST MOD 30 MIN: CPT

## 2022-03-07 RX ORDER — SERTRALINE 25 MG/1
25 TABLET, FILM COATED ORAL
Qty: 30 | Refills: 3 | Status: ACTIVE | COMMUNITY
Start: 2022-03-07 | End: 1900-01-01

## 2022-03-07 NOTE — PROCEDURE
[Pacemaker] : pacemaker [DDDR] : DDDR [Longevity: ___ months] : The estimated remaining battery life is [unfilled] months [de-identified] : medtronic

## 2022-03-07 NOTE — PHYSICAL EXAM

## 2022-03-07 NOTE — ADDENDUM
[FreeTextEntry1] : Documented by Elizabeth Chow acting as a scribe for Dr. Jairo Guerrero on 03/07/2022.

## 2022-03-07 NOTE — REASON FOR VISIT
[Symptom and Test Evaluation] : symptom and test evaluation [Hypertension] : hypertension [Coronary Artery Disease] : coronary artery disease [Other: ____] : [unfilled] [Follow-up Device Check] : is here today for a follow-up device check visit for

## 2022-03-07 NOTE — END OF VISIT
[Time Spent: ___ minutes] : I have spent [unfilled] minutes of time on the encounter. [FreeTextEntry3] : All medical record entries made by the Scribe were at my, Dr. Jairo Guerrero, direction and personally dictated by me on 03/07/2022. I have reviewed the chart and agree that the record accurately reflects my personal performance of the history, physical exam, assessment and plan. I have also personally directed, reviewed, and agreed with the chart.

## 2022-03-07 NOTE — DISCUSSION/SUMMARY
[Atrial Fibrillation] : atrial fibrillation [Cardiomyopathy] : cardiomyopathy [Coronary Artery Disease] : coronary artery disease [Dietary Modification] : dietary modification [Hypertension] : hypertension [Low Sodium Diet] : low sodium diet [NSAIDs Avoidance] : non-steroidal anti-inflammatory drugs avoidance [Mitral Regurgitation] : mitral regurgitation [Stable] : stable [None] : There are no changes in medication management [Sodium Restriction] : sodium restriction [Patient] : the patient [de-identified] : Paced rhythm  [Pacemaker Function Normal] : normal pacemaker function [FreeTextEntry1] : pt has 3 months left on battery, referred back to EP Dr. joseph

## 2022-03-07 NOTE — PHYSICAL EXAM

## 2022-03-07 NOTE — PHYSICAL EXAM

## 2022-03-07 NOTE — PROCEDURE
[Pacemaker] : pacemaker [DDDR] : DDDR [Longevity: ___ months] : The estimated remaining battery life is [unfilled] months [de-identified] : medtronic

## 2022-03-07 NOTE — DISCUSSION/SUMMARY
[Atrial Fibrillation] : atrial fibrillation [Cardiomyopathy] : cardiomyopathy [Coronary Artery Disease] : coronary artery disease [Dietary Modification] : dietary modification [Hypertension] : hypertension [Low Sodium Diet] : low sodium diet [NSAIDs Avoidance] : non-steroidal anti-inflammatory drugs avoidance [Mitral Regurgitation] : mitral regurgitation [Stable] : stable [None] : There are no changes in medication management [Sodium Restriction] : sodium restriction [Patient] : the patient [de-identified] : Paced rhythm  [Pacemaker Function Normal] : normal pacemaker function [FreeTextEntry1] : pt has 3 months left on battery, referred back to EP Dr. joseph

## 2022-03-07 NOTE — HISTORY OF PRESENT ILLNESS
[FreeTextEntry1] : Denies Chest Pain, SOB, Dizziness, Leg edema, Orthopnea, PND, Palpitations, Syncope, DEWEY, Diaphoresis.

## 2022-03-07 NOTE — PROCEDURE
[Pacemaker] : pacemaker [DDDR] : DDDR [Longevity: ___ months] : The estimated remaining battery life is [unfilled] months [de-identified] : medtronic

## 2022-03-07 NOTE — DISCUSSION/SUMMARY
[Atrial Fibrillation] : atrial fibrillation [Cardiomyopathy] : cardiomyopathy [Coronary Artery Disease] : coronary artery disease [Dietary Modification] : dietary modification [Hypertension] : hypertension [Low Sodium Diet] : low sodium diet [NSAIDs Avoidance] : non-steroidal anti-inflammatory drugs avoidance [Mitral Regurgitation] : mitral regurgitation [Stable] : stable [None] : There are no changes in medication management [Sodium Restriction] : sodium restriction [Patient] : the patient [de-identified] : Paced rhythm  [Pacemaker Function Normal] : normal pacemaker function [FreeTextEntry1] : pt has 3 months left on battery, referred back to EP Dr. joseph

## 2022-03-09 LAB
25(OH)D3 SERPL-MCNC: 54.1 NG/ML
ALBUMIN SERPL ELPH-MCNC: 4.2 G/DL
ALP BLD-CCNC: 54 U/L
ALT SERPL-CCNC: 12 U/L
ANION GAP SERPL CALC-SCNC: 11 MMOL/L
AST SERPL-CCNC: 15 U/L
BASOPHILS # BLD AUTO: 0.11 K/UL
BASOPHILS NFR BLD AUTO: 1.4 %
BILIRUB SERPL-MCNC: 0.6 MG/DL
BUN SERPL-MCNC: 25 MG/DL
CALCIUM SERPL-MCNC: 9.4 MG/DL
CHLORIDE SERPL-SCNC: 105 MMOL/L
CHOLEST SERPL-MCNC: 112 MG/DL
CO2 SERPL-SCNC: 26 MMOL/L
COVID-19 SPIKE DOMAIN ANTIBODY INTERPRETATION: POSITIVE
CREAT SERPL-MCNC: 1.04 MG/DL
EGFR: 69 ML/MIN/1.73M2
EOSINOPHIL # BLD AUTO: 0.12 K/UL
EOSINOPHIL NFR BLD AUTO: 1.6 %
ESTIMATED AVERAGE GLUCOSE: 111 MG/DL
FOLATE SERPL-MCNC: 19.5 NG/ML
GLUCOSE SERPL-MCNC: 60 MG/DL
HBA1C MFR BLD HPLC: 5.5 %
HCT VFR BLD CALC: 38.5 %
HDLC SERPL-MCNC: 53 MG/DL
HGB BLD-MCNC: 11.8 G/DL
IMM GRANULOCYTES NFR BLD AUTO: 0.4 %
LDLC SERPL CALC-MCNC: 44 MG/DL
LYMPHOCYTES # BLD AUTO: 2.16 K/UL
LYMPHOCYTES NFR BLD AUTO: 28.1 %
MAN DIFF?: NORMAL
MCHC RBC-ENTMCNC: 29.3 PG
MCHC RBC-ENTMCNC: 30.6 GM/DL
MCV RBC AUTO: 95.5 FL
MONOCYTES # BLD AUTO: 1.02 K/UL
MONOCYTES NFR BLD AUTO: 13.2 %
NEUTROPHILS # BLD AUTO: 4.26 K/UL
NEUTROPHILS NFR BLD AUTO: 55.3 %
NONHDLC SERPL-MCNC: 60 MG/DL
PLATELET # BLD AUTO: 186 K/UL
POTASSIUM SERPL-SCNC: 4.6 MMOL/L
PROT SERPL-MCNC: 6.6 G/DL
RBC # BLD: 4.03 M/UL
RBC # FLD: 15.2 %
SARS-COV-2 AB SERPL IA-ACNC: 69 U/ML
SODIUM SERPL-SCNC: 142 MMOL/L
T3 SERPL-MCNC: 92 NG/DL
T3FREE SERPL-MCNC: 2.72 PG/ML
T3RU NFR SERPL: 1 TBI
T4 FREE SERPL-MCNC: 1.4 NG/DL
T4 SERPL-MCNC: 8.3 UG/DL
TRIGL SERPL-MCNC: 79 MG/DL
TSH SERPL-ACNC: 1.19 UIU/ML
VIT B12 SERPL-MCNC: 596 PG/ML
WBC # FLD AUTO: 7.7 K/UL

## 2022-03-22 ENCOUNTER — APPOINTMENT (OUTPATIENT)
Dept: HEART AND VASCULAR | Facility: CLINIC | Age: 87
End: 2022-03-22
Payer: MEDICARE

## 2022-03-22 VITALS
HEIGHT: 71 IN | WEIGHT: 200 LBS | BODY MASS INDEX: 28 KG/M2 | SYSTOLIC BLOOD PRESSURE: 115 MMHG | DIASTOLIC BLOOD PRESSURE: 70 MMHG | HEART RATE: 82 BPM

## 2022-03-22 PROCEDURE — 93280 PM DEVICE PROGR EVAL DUAL: CPT

## 2022-03-22 NOTE — PROCEDURE
[No] : not [Atrial Fibrillation] : atrial fibrillation [Pacemaker] : pacemaker [DDDR] : DDDR [Lead Imp:  ___ohms] : lead impedance was [unfilled] ohms [Sensing Amplitude ___mv] : sensing amplitude was [unfilled] mv [___V @] : [unfilled] V [___ ms] : [unfilled] ms [de-identified] : with CHB  [de-identified] : Medtronic [de-identified] : 8/17/15 [de-identified] :  [de-identified] : 3 months to AYUSH  [de-identified] : AT/%

## 2022-03-22 NOTE — HISTORY OF PRESENT ILLNESS
[None] : The patient complains of no symptoms [Palpitations] : no palpitations [SOB] : no dyspnea [Syncope] : no syncope [Dizziness] : no dizziness [Chest Pain] : no chest pain or discomfort [Shoulder Pain] : no shoulder pain [Pain at Site] : no pain at device site [Erythema at Site] : no erythema at device site [Swelling at Site] : no swelling at device site [de-identified] : Mr. 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  \par \par He feels well and offers no device related complaints.  No chest pain, SOB, syncope or near syncope.  \par \par Reports he had stopped Xarelto months ago and started ASA because he doesn't want to be "attached to the medicine".   We subsequently put him on eliquis without the aspirin.  He is tolerating this.\par \par He has not had CARELINK.

## 2022-05-10 ENCOUNTER — APPOINTMENT (OUTPATIENT)
Dept: HEART AND VASCULAR | Facility: CLINIC | Age: 87
End: 2022-05-10

## 2022-05-13 ENCOUNTER — APPOINTMENT (OUTPATIENT)
Dept: HEART AND VASCULAR | Facility: CLINIC | Age: 87
End: 2022-05-13
Payer: MEDICARE

## 2022-05-13 ENCOUNTER — NON-APPOINTMENT (OUTPATIENT)
Age: 87
End: 2022-05-13

## 2022-05-13 PROCEDURE — 93296 REM INTERROG EVL PM/IDS: CPT

## 2022-05-13 PROCEDURE — 93294 REM INTERROG EVL PM/LDLS PM: CPT

## 2022-05-15 ENCOUNTER — FORM ENCOUNTER (OUTPATIENT)
Age: 87
End: 2022-05-15

## 2022-05-25 ENCOUNTER — RX RENEWAL (OUTPATIENT)
Age: 87
End: 2022-05-25

## 2022-06-15 ENCOUNTER — OUTPATIENT (OUTPATIENT)
Dept: OUTPATIENT SERVICES | Facility: HOSPITAL | Age: 87
LOS: 1 days | Discharge: HOME | End: 2022-06-15
Payer: MEDICARE

## 2022-06-15 LAB
ISTAT INR: 1.1 — SIGNIFICANT CHANGE UP (ref 0.88–1.16)
ISTAT PT: 13 SEC — HIGH (ref 10–12.9)
ISTAT VENOUS BE: 3 MMOL/L — SIGNIFICANT CHANGE UP (ref -2–3)
ISTAT VENOUS GLUCOSE: 94 MG/DL — SIGNIFICANT CHANGE UP (ref 70–99)
ISTAT VENOUS HCO3: 32 MMOL/L — HIGH (ref 23–28)
ISTAT VENOUS HEMATOCRIT: 37 % — LOW (ref 39–50)
ISTAT VENOUS HEMOGLOBIN: 12.6 GM/DL — LOW (ref 13–17)
ISTAT VENOUS IONIZED CALCIUM: 1.28 MMOL/L — SIGNIFICANT CHANGE UP (ref 1.12–1.3)
ISTAT VENOUS PCO2: 69 MMHG — HIGH (ref 41–51)
ISTAT VENOUS PH: 7.27 — LOW (ref 7.31–7.41)
ISTAT VENOUS PO2: <66 MMHG — LOW (ref 35–40)
ISTAT VENOUS POTASSIUM: 4.8 MMOL/L — SIGNIFICANT CHANGE UP (ref 3.5–5.3)
ISTAT VENOUS SO2: 16 % — SIGNIFICANT CHANGE UP
ISTAT VENOUS SODIUM: 141 MMOL/L — SIGNIFICANT CHANGE UP (ref 135–145)
ISTAT VENOUS TCO2: 34 MMOL/L — HIGH (ref 22–31)
POCT ISTAT CREATININE: 1 MG/DL — SIGNIFICANT CHANGE UP (ref 0.5–1.3)

## 2022-06-15 PROCEDURE — 84295 ASSAY OF SERUM SODIUM: CPT

## 2022-06-15 PROCEDURE — 85014 HEMATOCRIT: CPT

## 2022-06-15 PROCEDURE — 84132 ASSAY OF SERUM POTASSIUM: CPT

## 2022-06-15 PROCEDURE — 82803 BLOOD GASES ANY COMBINATION: CPT

## 2022-06-15 PROCEDURE — 33228 REMV&REPLC PM GEN DUAL LEAD: CPT

## 2022-06-15 PROCEDURE — 82330 ASSAY OF CALCIUM: CPT

## 2022-06-15 PROCEDURE — 82947 ASSAY GLUCOSE BLOOD QUANT: CPT

## 2022-06-15 PROCEDURE — C1889: CPT

## 2022-06-15 PROCEDURE — C1785: CPT

## 2022-06-15 PROCEDURE — 82565 ASSAY OF CREATININE: CPT

## 2022-06-15 PROCEDURE — 85610 PROTHROMBIN TIME: CPT

## 2022-06-15 NOTE — PROGRESS NOTE ADULT - SUBJECTIVE AND OBJECTIVE BOX
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  Patient reports  that he stopped his anticoagulation due to high cost of medication, he states that he plans to restart a/c as soon as he is able to afford co pay.        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  Plan to discharge home today.     EPS Progress Note    S: 86 yo M  with HTN, CAD S/P PCI stent in 2012, atrial fibrillation and high degree / complete AV block s/p PPM MDT  implant 8/17/15 presents today for PPM generator change due to pacemaker at [facility].  Patient is pacemaker dependent, no R wave noted on interrogation.   Patient reports  that he stopped his anticoagulation due to high cost of medication, he states that he plans to restart a/c as soon as he is able to afford co pay.  Importance of anticoagulation  and  risk of stroke were discussed with patient.      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  Plan to discharge home today.

## 2022-06-15 NOTE — PROGRESS NOTE ADULT - SUBJECTIVE AND OBJECTIVE BOX
Brief note, full note to follow.  Uncomplicated dual chamber pacemaker generator change.  Programmed VVIR  ppm.  Tolerated well, Ancef for prophylaxis and Tyrx pouch used.  For discharge home today.

## 2022-08-12 ENCOUNTER — APPOINTMENT (OUTPATIENT)
Dept: HEART AND VASCULAR | Facility: CLINIC | Age: 87
End: 2022-08-12

## 2022-08-17 ENCOUNTER — FORM ENCOUNTER (OUTPATIENT)
Age: 87
End: 2022-08-17

## 2022-09-07 ENCOUNTER — APPOINTMENT (OUTPATIENT)
Dept: HEART AND VASCULAR | Facility: CLINIC | Age: 87
End: 2022-09-07

## 2022-09-07 ENCOUNTER — LABORATORY RESULT (OUTPATIENT)
Age: 87
End: 2022-09-07

## 2022-09-07 ENCOUNTER — NON-APPOINTMENT (OUTPATIENT)
Age: 87
End: 2022-09-07

## 2022-09-07 VITALS
RESPIRATION RATE: 14 BRPM | SYSTOLIC BLOOD PRESSURE: 128 MMHG | HEIGHT: 71 IN | DIASTOLIC BLOOD PRESSURE: 80 MMHG | BODY MASS INDEX: 27.02 KG/M2 | HEART RATE: 68 BPM | WEIGHT: 193 LBS

## 2022-09-07 LAB — SARS-COV-2 N GENE NPH QL NAA+PROBE: NOT DETECTED

## 2022-09-07 PROCEDURE — 99214 OFFICE O/P EST MOD 30 MIN: CPT

## 2022-09-07 PROCEDURE — ZZZZZ: CPT

## 2022-09-07 PROCEDURE — 93000 ELECTROCARDIOGRAM COMPLETE: CPT

## 2022-09-07 PROCEDURE — 36415 COLL VENOUS BLD VENIPUNCTURE: CPT

## 2022-09-07 PROCEDURE — 93306 TTE W/DOPPLER COMPLETE: CPT

## 2022-09-07 PROCEDURE — 93880 EXTRACRANIAL BILAT STUDY: CPT

## 2022-09-07 NOTE — HISTORY OF PRESENT ILLNESS
[FreeTextEntry1] : Denies Chest Pain, SOB, Dizziness, Leg edema, Orthopnea, PND, Palpitations, Syncope, DEWEY, Diaphoresis. \par HTN/CAD/ Mitral Regurgitation - Echo ordered to assess LV function/possible progression of valvular heart disease.

## 2022-09-07 NOTE — PHYSICAL EXAM

## 2022-09-07 NOTE — DISCUSSION/SUMMARY
[Atrial Fibrillation] : atrial fibrillation [Coronary Artery Disease] : coronary artery disease [Dietary Modification] : dietary modification [Hypertension] : hypertension [Low Sodium Diet] : low sodium diet [NSAIDs Avoidance] : non-steroidal anti-inflammatory drugs avoidance [Mitral Regurgitation] : mitral regurgitation [Stable] : stable [None] : There are no changes in medication management [Sodium Restriction] : sodium restriction [Cardiomyopathy] : cardiomyopathy [Increase] : increasing diuretics [Patient] : the patient [de-identified] : s/p PPM [de-identified] : in SR [FreeTextEntry1] : Carotid artery disease- Stable; no further intervention at this time. \par Blood work drawn. Maintain a low caloric, low sodium, low cholesterol diet. Dietary counseling given, diet and exercise discussed in detail.

## 2022-09-07 NOTE — END OF VISIT
[FreeTextEntry3] : All medical record entries made by the Scribe were at my, Dr. Jairo Guerrero, direction and personally dictated by me on 09/07/2022. I have reviewed the chart and agree that the record accurately reflects my personal performance of the history, physical exam, assessment and plan. I have also personally directed, reviewed, and agreed with the chart.  [Time Spent: ___ minutes] : I have spent [unfilled] minutes of time on the encounter.

## 2022-09-08 ENCOUNTER — NON-APPOINTMENT (OUTPATIENT)
Age: 87
End: 2022-09-08

## 2022-09-08 LAB
25(OH)D3 SERPL-MCNC: 47.5 NG/ML
ALBUMIN SERPL ELPH-MCNC: 4.2 G/DL
ALP BLD-CCNC: 49 U/L
ALT SERPL-CCNC: 13 U/L
ANION GAP SERPL CALC-SCNC: 12 MMOL/L
AST SERPL-CCNC: 14 U/L
BASOPHILS # BLD AUTO: 0.08 K/UL
BASOPHILS NFR BLD AUTO: 1.1 %
BILIRUB SERPL-MCNC: 0.7 MG/DL
BUN SERPL-MCNC: 25 MG/DL
CALCIUM SERPL-MCNC: 9.3 MG/DL
CHLORIDE SERPL-SCNC: 103 MMOL/L
CHOLEST SERPL-MCNC: 120 MG/DL
CO2 SERPL-SCNC: 27 MMOL/L
COVID-19 SPIKE DOMAIN ANTIBODY INTERPRETATION: POSITIVE
CREAT SERPL-MCNC: 0.97 MG/DL
EGFR: 76 ML/MIN/1.73M2
EOSINOPHIL # BLD AUTO: 0.11 K/UL
EOSINOPHIL NFR BLD AUTO: 1.5 %
ESTIMATED AVERAGE GLUCOSE: 120 MG/DL
FOLATE SERPL-MCNC: 17.9 NG/ML
GLUCOSE SERPL-MCNC: 89 MG/DL
HBA1C MFR BLD HPLC: 5.8 %
HCT VFR BLD CALC: 36.8 %
HDLC SERPL-MCNC: 53 MG/DL
HGB BLD-MCNC: 11.3 G/DL
IMM GRANULOCYTES NFR BLD AUTO: 0.3 %
LDLC SERPL CALC-MCNC: 55 MG/DL
LYMPHOCYTES # BLD AUTO: 2.23 K/UL
LYMPHOCYTES NFR BLD AUTO: 29.7 %
MAN DIFF?: NORMAL
MCHC RBC-ENTMCNC: 29.6 PG
MCHC RBC-ENTMCNC: 30.7 GM/DL
MCV RBC AUTO: 96.3 FL
MONOCYTES # BLD AUTO: 0.69 K/UL
MONOCYTES NFR BLD AUTO: 9.2 %
NEUTROPHILS # BLD AUTO: 4.39 K/UL
NEUTROPHILS NFR BLD AUTO: 58.2 %
NONHDLC SERPL-MCNC: 67 MG/DL
PLATELET # BLD AUTO: 158 K/UL
POTASSIUM SERPL-SCNC: 5.2 MMOL/L
PROT SERPL-MCNC: 6.3 G/DL
RBC # BLD: 3.82 M/UL
RBC # FLD: 16.7 %
SARS-COV-2 AB SERPL IA-ACNC: 56 U/ML
SODIUM SERPL-SCNC: 142 MMOL/L
T3 SERPL-MCNC: 102 NG/DL
T3FREE SERPL-MCNC: 2.72 PG/ML
T3RU NFR SERPL: 0.9 TBI
T4 FREE SERPL-MCNC: 1.6 NG/DL
T4 SERPL-MCNC: 8.5 UG/DL
TRIGL SERPL-MCNC: 60 MG/DL
TSH SERPL-ACNC: 0.75 UIU/ML
VIT B12 SERPL-MCNC: 582 PG/ML
WBC # FLD AUTO: 7.52 K/UL

## 2022-09-14 ENCOUNTER — APPOINTMENT (OUTPATIENT)
Dept: HEART AND VASCULAR | Facility: CLINIC | Age: 87
End: 2022-09-14

## 2022-09-19 ENCOUNTER — FORM ENCOUNTER (OUTPATIENT)
Age: 87
End: 2022-09-19

## 2022-09-26 ENCOUNTER — APPOINTMENT (OUTPATIENT)
Dept: HEART AND VASCULAR | Facility: CLINIC | Age: 87
End: 2022-09-26

## 2022-09-26 ENCOUNTER — NON-APPOINTMENT (OUTPATIENT)
Age: 87
End: 2022-09-26

## 2022-09-26 PROCEDURE — 93294 REM INTERROG EVL PM/LDLS PM: CPT

## 2022-09-26 PROCEDURE — 93296 REM INTERROG EVL PM/IDS: CPT

## 2023-01-01 ENCOUNTER — LABORATORY RESULT (OUTPATIENT)
Age: 88
End: 2023-01-01

## 2023-01-01 ENCOUNTER — APPOINTMENT (OUTPATIENT)
Dept: HEART AND VASCULAR | Facility: CLINIC | Age: 88
End: 2023-01-01
Payer: MEDICARE

## 2023-01-01 ENCOUNTER — NON-APPOINTMENT (OUTPATIENT)
Age: 88
End: 2023-01-01

## 2023-01-01 ENCOUNTER — RX RENEWAL (OUTPATIENT)
Age: 88
End: 2023-01-01

## 2023-01-01 ENCOUNTER — APPOINTMENT (OUTPATIENT)
Age: 88
End: 2023-01-01

## 2023-01-01 ENCOUNTER — TRANSCRIPTION ENCOUNTER (OUTPATIENT)
Age: 88
End: 2023-01-01

## 2023-01-01 ENCOUNTER — APPOINTMENT (OUTPATIENT)
Dept: HEART AND VASCULAR | Facility: CLINIC | Age: 88
End: 2023-01-01

## 2023-01-01 ENCOUNTER — INPATIENT (INPATIENT)
Facility: HOSPITAL | Age: 88
LOS: 7 days | Discharge: HOME HEALTH | End: 2023-09-26
Attending: INTERNAL MEDICINE | Admitting: INTERNAL MEDICINE
Payer: MEDICARE

## 2023-01-01 VITALS
HEART RATE: 66 BPM | SYSTOLIC BLOOD PRESSURE: 80 MMHG | RESPIRATION RATE: 14 BRPM | HEIGHT: 71 IN | WEIGHT: 140 LBS | BODY MASS INDEX: 19.6 KG/M2 | DIASTOLIC BLOOD PRESSURE: 44 MMHG

## 2023-01-01 VITALS
HEART RATE: 65 BPM | TEMPERATURE: 98 F | OXYGEN SATURATION: 100 % | DIASTOLIC BLOOD PRESSURE: 62 MMHG | SYSTOLIC BLOOD PRESSURE: 108 MMHG | RESPIRATION RATE: 18 BRPM

## 2023-01-01 VITALS
HEIGHT: 71 IN | DIASTOLIC BLOOD PRESSURE: 60 MMHG | SYSTOLIC BLOOD PRESSURE: 110 MMHG | RESPIRATION RATE: 14 BRPM | WEIGHT: 175 LBS | BODY MASS INDEX: 24.5 KG/M2 | HEART RATE: 64 BPM

## 2023-01-01 VITALS
OXYGEN SATURATION: 100 % | DIASTOLIC BLOOD PRESSURE: 65 MMHG | SYSTOLIC BLOOD PRESSURE: 129 MMHG | RESPIRATION RATE: 16 BRPM | TEMPERATURE: 98 F | HEART RATE: 86 BPM

## 2023-01-01 VITALS
WEIGHT: 158 LBS | HEIGHT: 71 IN | SYSTOLIC BLOOD PRESSURE: 110 MMHG | BODY MASS INDEX: 22.12 KG/M2 | HEART RATE: 69 BPM | DIASTOLIC BLOOD PRESSURE: 70 MMHG

## 2023-01-01 VITALS
HEART RATE: 72 BPM | WEIGHT: 191 LBS | SYSTOLIC BLOOD PRESSURE: 130 MMHG | DIASTOLIC BLOOD PRESSURE: 70 MMHG | HEIGHT: 71 IN | RESPIRATION RATE: 14 BRPM | BODY MASS INDEX: 26.74 KG/M2

## 2023-01-01 DIAGNOSIS — Z09 ENCOUNTER FOR FOLLOW-UP EXAMINATION AFTER COMPLETED TREATMENT FOR CONDITIONS OTHER THAN MALIGNANT NEOPLASM: ICD-10-CM

## 2023-01-01 DIAGNOSIS — I65.21 OCCLUSION AND STENOSIS OF RIGHT CAROTID ARTERY: ICD-10-CM

## 2023-01-01 DIAGNOSIS — I48.91 UNSPECIFIED ATRIAL FIBRILLATION: ICD-10-CM

## 2023-01-01 DIAGNOSIS — I25.10 ATHEROSCLEROTIC HEART DISEASE OF NATIVE CORONARY ARTERY W/OUT ANGINA PECTORIS: ICD-10-CM

## 2023-01-01 DIAGNOSIS — K92.1 MELENA: ICD-10-CM

## 2023-01-01 DIAGNOSIS — K92.2 GASTROINTESTINAL HEMORRHAGE, UNSPECIFIED: ICD-10-CM

## 2023-01-01 DIAGNOSIS — K52.89 OTHER SPECIFIED NONINFECTIVE GASTROENTERITIS AND COLITIS: ICD-10-CM

## 2023-01-01 DIAGNOSIS — E78.5 HYPERLIPIDEMIA, UNSPECIFIED: ICD-10-CM

## 2023-01-01 DIAGNOSIS — Z51.5 ENCOUNTER FOR PALLIATIVE CARE: ICD-10-CM

## 2023-01-01 DIAGNOSIS — I42.9 CARDIOMYOPATHY, UNSPECIFIED: ICD-10-CM

## 2023-01-01 DIAGNOSIS — E55.9 VITAMIN D DEFICIENCY, UNSPECIFIED: ICD-10-CM

## 2023-01-01 DIAGNOSIS — Z95.0 PRESENCE OF CARDIAC PACEMAKER: ICD-10-CM

## 2023-01-01 DIAGNOSIS — Z71.89 OTHER SPECIFIED COUNSELING: ICD-10-CM

## 2023-01-01 DIAGNOSIS — Z29.9 ENCOUNTER FOR PROPHYLACTIC MEASURES, UNSPECIFIED: ICD-10-CM

## 2023-01-01 DIAGNOSIS — C79.9 SECONDARY MALIGNANT NEOPLASM OF UNSPECIFIED SITE: ICD-10-CM

## 2023-01-01 DIAGNOSIS — I10 ESSENTIAL (PRIMARY) HYPERTENSION: ICD-10-CM

## 2023-01-01 DIAGNOSIS — T85.848A PAIN DUE TO OTHER INTERNAL PROSTHETIC DEVICES, IMPLANTS AND GRAFTS, INITIAL ENCOUNTER: ICD-10-CM

## 2023-01-01 DIAGNOSIS — K62.5 HEMORRHAGE OF ANUS AND RECTUM: ICD-10-CM

## 2023-01-01 DIAGNOSIS — Z20.822 CONTACT WITH AND (SUSPECTED) EXPOSURE TO COVID-19: ICD-10-CM

## 2023-01-01 DIAGNOSIS — I34.0 NONRHEUMATIC MITRAL (VALVE) INSUFFICIENCY: ICD-10-CM

## 2023-01-01 DIAGNOSIS — I44.30 UNSPECIFIED ATRIOVENTRICULAR BLOCK: ICD-10-CM

## 2023-01-01 DIAGNOSIS — R53.81 OTHER MALAISE: ICD-10-CM

## 2023-01-01 DIAGNOSIS — R63.4 ABNORMAL WEIGHT LOSS: ICD-10-CM

## 2023-01-01 LAB
25(OH)D3 SERPL-MCNC: 36.3 NG/ML
25(OH)D3 SERPL-MCNC: 46.4 NG/ML
ALBUMIN SERPL ELPH-MCNC: 2.7 G/DL — LOW (ref 3.3–5)
ALBUMIN SERPL ELPH-MCNC: 2.7 G/DL — LOW (ref 3.3–5)
ALBUMIN SERPL ELPH-MCNC: 2.8 G/DL — LOW (ref 3.3–5)
ALBUMIN SERPL ELPH-MCNC: 2.8 G/DL — LOW (ref 3.3–5)
ALBUMIN SERPL ELPH-MCNC: 2.9 G/DL — LOW (ref 3.3–5)
ALBUMIN SERPL ELPH-MCNC: 3 G/DL — LOW (ref 3.3–5)
ALBUMIN SERPL ELPH-MCNC: 3.1 G/DL
ALBUMIN SERPL ELPH-MCNC: 3.1 G/DL — LOW (ref 3.3–5)
ALBUMIN SERPL ELPH-MCNC: 3.3 G/DL — SIGNIFICANT CHANGE UP (ref 3.3–5)
ALBUMIN SERPL ELPH-MCNC: 3.8 G/DL
ALBUMIN SERPL ELPH-MCNC: 4.3 G/DL
ALP BLD-CCNC: 142 U/L
ALP BLD-CCNC: 485 U/L
ALP BLD-CCNC: 489 U/L
ALP SERPL-CCNC: 396 U/L — HIGH (ref 40–120)
ALP SERPL-CCNC: 398 U/L — HIGH (ref 40–120)
ALP SERPL-CCNC: 402 U/L — HIGH (ref 40–120)
ALP SERPL-CCNC: 407 U/L — HIGH (ref 40–120)
ALP SERPL-CCNC: 410 U/L — HIGH (ref 40–120)
ALP SERPL-CCNC: 416 U/L — HIGH (ref 40–120)
ALP SERPL-CCNC: 451 U/L — HIGH (ref 40–120)
ALP SERPL-CCNC: 457 U/L — HIGH (ref 40–120)
ALT FLD-CCNC: 11 U/L — SIGNIFICANT CHANGE UP (ref 4–41)
ALT FLD-CCNC: 5 U/L — SIGNIFICANT CHANGE UP (ref 4–41)
ALT FLD-CCNC: 7 U/L — SIGNIFICANT CHANGE UP (ref 4–41)
ALT FLD-CCNC: 8 U/L — SIGNIFICANT CHANGE UP (ref 4–41)
ALT FLD-CCNC: 9 U/L — SIGNIFICANT CHANGE UP (ref 4–41)
ALT FLD-CCNC: 9 U/L — SIGNIFICANT CHANGE UP (ref 4–41)
ALT SERPL-CCNC: 10 U/L
ALT SERPL-CCNC: 14 U/L
ALT SERPL-CCNC: 9 U/L
ANION GAP SERPL CALC-SCNC: 10 MMOL/L — SIGNIFICANT CHANGE UP (ref 7–14)
ANION GAP SERPL CALC-SCNC: 11 MMOL/L — SIGNIFICANT CHANGE UP (ref 7–14)
ANION GAP SERPL CALC-SCNC: 12 MMOL/L
ANION GAP SERPL CALC-SCNC: 12 MMOL/L
ANION GAP SERPL CALC-SCNC: 13 MMOL/L — SIGNIFICANT CHANGE UP (ref 7–14)
ANION GAP SERPL CALC-SCNC: 14 MMOL/L — SIGNIFICANT CHANGE UP (ref 7–14)
ANION GAP SERPL CALC-SCNC: 15 MMOL/L
ANION GAP SERPL CALC-SCNC: 7 MMOL/L — SIGNIFICANT CHANGE UP (ref 7–14)
ANION GAP SERPL CALC-SCNC: 8 MMOL/L — SIGNIFICANT CHANGE UP (ref 7–14)
ANION GAP SERPL CALC-SCNC: 8 MMOL/L — SIGNIFICANT CHANGE UP (ref 7–14)
ANION GAP SERPL CALC-SCNC: 9 MMOL/L — SIGNIFICANT CHANGE UP (ref 7–14)
APPEARANCE UR: CLEAR — SIGNIFICANT CHANGE UP
APTT BLD: 30.5 SEC — SIGNIFICANT CHANGE UP (ref 24.5–35.6)
APTT BLD: 31.8 SEC — SIGNIFICANT CHANGE UP (ref 24.5–35.6)
AST SERPL-CCNC: 11 U/L — SIGNIFICANT CHANGE UP (ref 4–40)
AST SERPL-CCNC: 12 U/L — SIGNIFICANT CHANGE UP (ref 4–40)
AST SERPL-CCNC: 13 U/L — SIGNIFICANT CHANGE UP (ref 4–40)
AST SERPL-CCNC: 15 U/L — SIGNIFICANT CHANGE UP (ref 4–40)
AST SERPL-CCNC: 16 U/L
AST SERPL-CCNC: 16 U/L
AST SERPL-CCNC: 17 U/L — SIGNIFICANT CHANGE UP (ref 4–40)
AST SERPL-CCNC: 19 U/L
B PERT DNA SPEC QL NAA+PROBE: SIGNIFICANT CHANGE UP
B PERT+PARAPERT DNA PNL SPEC NAA+PROBE: SIGNIFICANT CHANGE UP
BACTERIA # UR AUTO: ABNORMAL /HPF
BASOPHILS # BLD AUTO: 0.03 K/UL — SIGNIFICANT CHANGE UP (ref 0–0.2)
BASOPHILS # BLD AUTO: 0.05 K/UL — SIGNIFICANT CHANGE UP (ref 0–0.2)
BASOPHILS # BLD AUTO: 0.05 K/UL — SIGNIFICANT CHANGE UP (ref 0–0.2)
BASOPHILS # BLD AUTO: 0.06 K/UL — SIGNIFICANT CHANGE UP (ref 0–0.2)
BASOPHILS # BLD AUTO: 0.07 K/UL — SIGNIFICANT CHANGE UP (ref 0–0.2)
BASOPHILS # BLD AUTO: 0.09 K/UL
BASOPHILS # BLD AUTO: 0.09 K/UL — SIGNIFICANT CHANGE UP (ref 0–0.2)
BASOPHILS # BLD AUTO: 0.14 K/UL
BASOPHILS NFR BLD AUTO: 0.3 % — SIGNIFICANT CHANGE UP (ref 0–2)
BASOPHILS NFR BLD AUTO: 0.4 % — SIGNIFICANT CHANGE UP (ref 0–2)
BASOPHILS NFR BLD AUTO: 0.4 % — SIGNIFICANT CHANGE UP (ref 0–2)
BASOPHILS NFR BLD AUTO: 0.5 % — SIGNIFICANT CHANGE UP (ref 0–2)
BASOPHILS NFR BLD AUTO: 0.5 % — SIGNIFICANT CHANGE UP (ref 0–2)
BASOPHILS NFR BLD AUTO: 0.6 % — SIGNIFICANT CHANGE UP (ref 0–2)
BASOPHILS NFR BLD AUTO: 0.9 %
BASOPHILS NFR BLD AUTO: 1.2 %
BILIRUB SERPL-MCNC: 0.4 MG/DL — SIGNIFICANT CHANGE UP (ref 0.2–1.2)
BILIRUB SERPL-MCNC: 0.5 MG/DL — SIGNIFICANT CHANGE UP (ref 0.2–1.2)
BILIRUB SERPL-MCNC: 0.5 MG/DL — SIGNIFICANT CHANGE UP (ref 0.2–1.2)
BILIRUB SERPL-MCNC: 0.6 MG/DL
BILIRUB SERPL-MCNC: 0.6 MG/DL — SIGNIFICANT CHANGE UP (ref 0.2–1.2)
BILIRUB UR-MCNC: NEGATIVE — SIGNIFICANT CHANGE UP
BLD GP AB SCN SERPL QL: NEGATIVE — SIGNIFICANT CHANGE UP
BORDETELLA PARAPERTUSSIS (RAPRVP): SIGNIFICANT CHANGE UP
BUN SERPL-MCNC: 17 MG/DL
BUN SERPL-MCNC: 17 MG/DL — SIGNIFICANT CHANGE UP (ref 7–23)
BUN SERPL-MCNC: 18 MG/DL
BUN SERPL-MCNC: 18 MG/DL — SIGNIFICANT CHANGE UP (ref 7–23)
BUN SERPL-MCNC: 19 MG/DL — SIGNIFICANT CHANGE UP (ref 7–23)
BUN SERPL-MCNC: 20 MG/DL — SIGNIFICANT CHANGE UP (ref 7–23)
BUN SERPL-MCNC: 29 MG/DL
C PNEUM DNA SPEC QL NAA+PROBE: SIGNIFICANT CHANGE UP
CALCIUM SERPL-MCNC: 8.1 MG/DL — LOW (ref 8.4–10.5)
CALCIUM SERPL-MCNC: 8.3 MG/DL
CALCIUM SERPL-MCNC: 8.4 MG/DL — SIGNIFICANT CHANGE UP (ref 8.4–10.5)
CALCIUM SERPL-MCNC: 8.4 MG/DL — SIGNIFICANT CHANGE UP (ref 8.4–10.5)
CALCIUM SERPL-MCNC: 8.8 MG/DL — SIGNIFICANT CHANGE UP (ref 8.4–10.5)
CALCIUM SERPL-MCNC: 9.2 MG/DL
CALCIUM SERPL-MCNC: 9.3 MG/DL
CAST: 0 /LPF — SIGNIFICANT CHANGE UP (ref 0–4)
CHLORIDE SERPL-SCNC: 100 MMOL/L — SIGNIFICANT CHANGE UP (ref 98–107)
CHLORIDE SERPL-SCNC: 101 MMOL/L
CHLORIDE SERPL-SCNC: 101 MMOL/L — SIGNIFICANT CHANGE UP (ref 98–107)
CHLORIDE SERPL-SCNC: 102 MMOL/L — SIGNIFICANT CHANGE UP (ref 98–107)
CHLORIDE SERPL-SCNC: 103 MMOL/L
CHLORIDE SERPL-SCNC: 103 MMOL/L
CHLORIDE SERPL-SCNC: 99 MMOL/L — SIGNIFICANT CHANGE UP (ref 98–107)
CHOLEST SERPL-MCNC: 119 MG/DL
CHOLEST SERPL-MCNC: 157 MG/DL
CO2 SERPL-SCNC: 23 MMOL/L
CO2 SERPL-SCNC: 23 MMOL/L — SIGNIFICANT CHANGE UP (ref 22–31)
CO2 SERPL-SCNC: 24 MMOL/L
CO2 SERPL-SCNC: 25 MMOL/L
CO2 SERPL-SCNC: 25 MMOL/L — SIGNIFICANT CHANGE UP (ref 22–31)
CO2 SERPL-SCNC: 25 MMOL/L — SIGNIFICANT CHANGE UP (ref 22–31)
CO2 SERPL-SCNC: 26 MMOL/L — SIGNIFICANT CHANGE UP (ref 22–31)
CO2 SERPL-SCNC: 27 MMOL/L — SIGNIFICANT CHANGE UP (ref 22–31)
CO2 SERPL-SCNC: 27 MMOL/L — SIGNIFICANT CHANGE UP (ref 22–31)
CO2 SERPL-SCNC: 28 MMOL/L — SIGNIFICANT CHANGE UP (ref 22–31)
CO2 SERPL-SCNC: 28 MMOL/L — SIGNIFICANT CHANGE UP (ref 22–31)
COLOR SPEC: YELLOW — SIGNIFICANT CHANGE UP
CREAT SERPL-MCNC: 0.62 MG/DL — SIGNIFICANT CHANGE UP (ref 0.5–1.3)
CREAT SERPL-MCNC: 0.67 MG/DL — SIGNIFICANT CHANGE UP (ref 0.5–1.3)
CREAT SERPL-MCNC: 0.71 MG/DL — SIGNIFICANT CHANGE UP (ref 0.5–1.3)
CREAT SERPL-MCNC: 0.74 MG/DL — SIGNIFICANT CHANGE UP (ref 0.5–1.3)
CREAT SERPL-MCNC: 0.75 MG/DL — SIGNIFICANT CHANGE UP (ref 0.5–1.3)
CREAT SERPL-MCNC: 0.76 MG/DL — SIGNIFICANT CHANGE UP (ref 0.5–1.3)
CREAT SERPL-MCNC: 0.82 MG/DL — SIGNIFICANT CHANGE UP (ref 0.5–1.3)
CREAT SERPL-MCNC: 0.84 MG/DL
CREAT SERPL-MCNC: 0.84 MG/DL — SIGNIFICANT CHANGE UP (ref 0.5–1.3)
CREAT SERPL-MCNC: 0.99 MG/DL
CREAT SERPL-MCNC: 1.1 MG/DL
CULTURE RESULTS: NO GROWTH — SIGNIFICANT CHANGE UP
CULTURE RESULTS: SIGNIFICANT CHANGE UP
DIFF PNL FLD: ABNORMAL
EGFR: 65 ML/MIN/1.73M2
EGFR: 73 ML/MIN/1.73M2
EGFR: 84 ML/MIN/1.73M2
EGFR: 84 ML/MIN/1.73M2 — SIGNIFICANT CHANGE UP
EGFR: 84 ML/MIN/1.73M2 — SIGNIFICANT CHANGE UP
EGFR: 86 ML/MIN/1.73M2 — SIGNIFICANT CHANGE UP
EGFR: 87 ML/MIN/1.73M2 — SIGNIFICANT CHANGE UP
EGFR: 87 ML/MIN/1.73M2 — SIGNIFICANT CHANGE UP
EGFR: 88 ML/MIN/1.73M2 — SIGNIFICANT CHANGE UP
EGFR: 90 ML/MIN/1.73M2 — SIGNIFICANT CHANGE UP
EGFR: 92 ML/MIN/1.73M2 — SIGNIFICANT CHANGE UP
EOSINOPHIL # BLD AUTO: 0 K/UL — SIGNIFICANT CHANGE UP (ref 0–0.5)
EOSINOPHIL # BLD AUTO: 0.01 K/UL — SIGNIFICANT CHANGE UP (ref 0–0.5)
EOSINOPHIL # BLD AUTO: 0.01 K/UL — SIGNIFICANT CHANGE UP (ref 0–0.5)
EOSINOPHIL # BLD AUTO: 0.03 K/UL — SIGNIFICANT CHANGE UP (ref 0–0.5)
EOSINOPHIL # BLD AUTO: 0.04 K/UL
EOSINOPHIL # BLD AUTO: 0.04 K/UL — SIGNIFICANT CHANGE UP (ref 0–0.5)
EOSINOPHIL # BLD AUTO: 0.13 K/UL — SIGNIFICANT CHANGE UP (ref 0–0.5)
EOSINOPHIL # BLD AUTO: 0.16 K/UL
EOSINOPHIL NFR BLD AUTO: 0 % — SIGNIFICANT CHANGE UP (ref 0–6)
EOSINOPHIL NFR BLD AUTO: 0.1 % — SIGNIFICANT CHANGE UP (ref 0–6)
EOSINOPHIL NFR BLD AUTO: 0.1 % — SIGNIFICANT CHANGE UP (ref 0–6)
EOSINOPHIL NFR BLD AUTO: 0.2 % — SIGNIFICANT CHANGE UP (ref 0–6)
EOSINOPHIL NFR BLD AUTO: 0.3 %
EOSINOPHIL NFR BLD AUTO: 0.3 % — SIGNIFICANT CHANGE UP (ref 0–6)
EOSINOPHIL NFR BLD AUTO: 0.8 % — SIGNIFICANT CHANGE UP (ref 0–6)
EOSINOPHIL NFR BLD AUTO: 2.1 %
ESTIMATED AVERAGE GLUCOSE: 117 MG/DL
ESTIMATED AVERAGE GLUCOSE: 117 MG/DL
FERRITIN SERPL-MCNC: 108 NG/ML — SIGNIFICANT CHANGE UP (ref 30–400)
FLUAV SUBTYP SPEC NAA+PROBE: SIGNIFICANT CHANGE UP
FLUBV RNA SPEC QL NAA+PROBE: SIGNIFICANT CHANGE UP
FOLATE SERPL-MCNC: 13.7 NG/ML
FOLATE SERPL-MCNC: 9.8 NG/ML — SIGNIFICANT CHANGE UP (ref 3.1–17.5)
FOLATE SERPL-MCNC: >20 NG/ML
GGT SERPL-CCNC: 14 U/L — SIGNIFICANT CHANGE UP (ref 8–61)
GLUCOSE BLDC GLUCOMTR-MCNC: 109 MG/DL — HIGH (ref 70–99)
GLUCOSE SERPL-MCNC: 107 MG/DL — HIGH (ref 70–99)
GLUCOSE SERPL-MCNC: 119 MG/DL — HIGH (ref 70–99)
GLUCOSE SERPL-MCNC: 149 MG/DL
GLUCOSE SERPL-MCNC: 85 MG/DL
GLUCOSE SERPL-MCNC: 85 MG/DL — SIGNIFICANT CHANGE UP (ref 70–99)
GLUCOSE SERPL-MCNC: 87 MG/DL — SIGNIFICANT CHANGE UP (ref 70–99)
GLUCOSE SERPL-MCNC: 92 MG/DL
GLUCOSE SERPL-MCNC: 94 MG/DL — SIGNIFICANT CHANGE UP (ref 70–99)
GLUCOSE SERPL-MCNC: 95 MG/DL — SIGNIFICANT CHANGE UP (ref 70–99)
GLUCOSE SERPL-MCNC: 95 MG/DL — SIGNIFICANT CHANGE UP (ref 70–99)
GLUCOSE SERPL-MCNC: 98 MG/DL — SIGNIFICANT CHANGE UP (ref 70–99)
GLUCOSE UR QL: NEGATIVE MG/DL — SIGNIFICANT CHANGE UP
HADV DNA SPEC QL NAA+PROBE: SIGNIFICANT CHANGE UP
HAPTOGLOB SERPL-MCNC: 277 MG/DL — HIGH (ref 34–200)
HBA1C MFR BLD HPLC: 5.7 %
HBA1C MFR BLD HPLC: 5.7 %
HCOV 229E RNA SPEC QL NAA+PROBE: SIGNIFICANT CHANGE UP
HCOV HKU1 RNA SPEC QL NAA+PROBE: SIGNIFICANT CHANGE UP
HCOV NL63 RNA SPEC QL NAA+PROBE: SIGNIFICANT CHANGE UP
HCOV OC43 RNA SPEC QL NAA+PROBE: SIGNIFICANT CHANGE UP
HCT VFR BLD CALC: 31.2 % — LOW (ref 39–50)
HCT VFR BLD CALC: 31.5 % — LOW (ref 39–50)
HCT VFR BLD CALC: 31.7 % — LOW (ref 39–50)
HCT VFR BLD CALC: 31.8 % — LOW (ref 39–50)
HCT VFR BLD CALC: 32.1 % — LOW (ref 39–50)
HCT VFR BLD CALC: 32.3 % — LOW (ref 39–50)
HCT VFR BLD CALC: 32.5 % — LOW (ref 39–50)
HCT VFR BLD CALC: 33.2 % — LOW (ref 39–50)
HCT VFR BLD CALC: 33.9 %
HCT VFR BLD CALC: 34.9 %
HCT VFR BLD CALC: 35.2 % — LOW (ref 39–50)
HDLC SERPL-MCNC: 53 MG/DL
HDLC SERPL-MCNC: 59 MG/DL
HGB BLD-MCNC: 10.1 G/DL
HGB BLD-MCNC: 10.2 G/DL — LOW (ref 13–17)
HGB BLD-MCNC: 10.3 G/DL — LOW (ref 13–17)
HGB BLD-MCNC: 10.3 G/DL — LOW (ref 13–17)
HGB BLD-MCNC: 10.4 G/DL — LOW (ref 13–17)
HGB BLD-MCNC: 10.4 G/DL — LOW (ref 13–17)
HGB BLD-MCNC: 10.5 G/DL — LOW (ref 13–17)
HGB BLD-MCNC: 10.5 G/DL — LOW (ref 13–17)
HGB BLD-MCNC: 10.6 G/DL — LOW (ref 13–17)
HGB BLD-MCNC: 11 G/DL
HGB BLD-MCNC: 11.3 G/DL — LOW (ref 13–17)
HMPV RNA SPEC QL NAA+PROBE: SIGNIFICANT CHANGE UP
HPIV1 RNA SPEC QL NAA+PROBE: SIGNIFICANT CHANGE UP
HPIV2 RNA SPEC QL NAA+PROBE: SIGNIFICANT CHANGE UP
HPIV3 RNA SPEC QL NAA+PROBE: SIGNIFICANT CHANGE UP
HPIV4 RNA SPEC QL NAA+PROBE: SIGNIFICANT CHANGE UP
IANC: 6.98 K/UL — SIGNIFICANT CHANGE UP (ref 1.8–7.4)
IANC: 8.04 K/UL — HIGH (ref 1.8–7.4)
IANC: 8.06 K/UL — HIGH (ref 1.8–7.4)
IANC: 8.86 K/UL — HIGH (ref 1.8–7.4)
IANC: 8.93 K/UL — HIGH (ref 1.8–7.4)
IANC: 9.18 K/UL — HIGH (ref 1.8–7.4)
IMM GRANULOCYTES NFR BLD AUTO: 0.3 %
IMM GRANULOCYTES NFR BLD AUTO: 0.6 %
IMM GRANULOCYTES NFR BLD AUTO: 0.7 % — SIGNIFICANT CHANGE UP (ref 0–0.9)
IMM GRANULOCYTES NFR BLD AUTO: 0.7 % — SIGNIFICANT CHANGE UP (ref 0–0.9)
IMM GRANULOCYTES NFR BLD AUTO: 0.8 % — SIGNIFICANT CHANGE UP (ref 0–0.9)
IMM GRANULOCYTES NFR BLD AUTO: 1.4 % — HIGH (ref 0–0.9)
INR BLD: 1.05 RATIO — SIGNIFICANT CHANGE UP (ref 0.85–1.18)
INR BLD: 1.38 RATIO — HIGH (ref 0.85–1.18)
IRON SATN MFR SERPL: 17 UG/DL — LOW (ref 45–165)
IRON SATN MFR SERPL: 8 % — LOW (ref 14–50)
KETONES UR-MCNC: NEGATIVE MG/DL — SIGNIFICANT CHANGE UP
LDH SERPL L TO P-CCNC: 186 U/L — SIGNIFICANT CHANGE UP (ref 135–225)
LDLC SERPL CALC-MCNC: 49 MG/DL
LDLC SERPL CALC-MCNC: 90 MG/DL
LEUKOCYTE ESTERASE UR-ACNC: NEGATIVE — SIGNIFICANT CHANGE UP
LYMPHOCYTES # BLD AUTO: 19.9 % — SIGNIFICANT CHANGE UP (ref 13–44)
LYMPHOCYTES # BLD AUTO: 2.39 K/UL — SIGNIFICANT CHANGE UP (ref 1–3.3)
LYMPHOCYTES # BLD AUTO: 2.41 K/UL — SIGNIFICANT CHANGE UP (ref 1–3.3)
LYMPHOCYTES # BLD AUTO: 2.55 K/UL
LYMPHOCYTES # BLD AUTO: 2.6 K/UL — SIGNIFICANT CHANGE UP (ref 1–3.3)
LYMPHOCYTES # BLD AUTO: 2.76 K/UL — SIGNIFICANT CHANGE UP (ref 1–3.3)
LYMPHOCYTES # BLD AUTO: 20.1 % — SIGNIFICANT CHANGE UP (ref 13–44)
LYMPHOCYTES # BLD AUTO: 20.8 % — SIGNIFICANT CHANGE UP (ref 13–44)
LYMPHOCYTES # BLD AUTO: 22.2 % — SIGNIFICANT CHANGE UP (ref 13–44)
LYMPHOCYTES # BLD AUTO: 25.3 % — SIGNIFICANT CHANGE UP (ref 13–44)
LYMPHOCYTES # BLD AUTO: 3.29 K/UL — SIGNIFICANT CHANGE UP (ref 1–3.3)
LYMPHOCYTES # BLD AUTO: 31.6 % — SIGNIFICANT CHANGE UP (ref 13–44)
LYMPHOCYTES # BLD AUTO: 4.07 K/UL
LYMPHOCYTES # BLD AUTO: 5.04 K/UL — HIGH (ref 1–3.3)
LYMPHOCYTES NFR BLD AUTO: 26.7 %
LYMPHOCYTES NFR BLD AUTO: 34.2 %
M PNEUMO DNA SPEC QL NAA+PROBE: SIGNIFICANT CHANGE UP
MAGNESIUM SERPL-MCNC: 2 MG/DL — SIGNIFICANT CHANGE UP (ref 1.6–2.6)
MAGNESIUM SERPL-MCNC: 2.1 MG/DL — SIGNIFICANT CHANGE UP (ref 1.6–2.6)
MAN DIFF?: NORMAL
MAN DIFF?: NORMAL
MCHC RBC-ENTMCNC: 27.4 PG
MCHC RBC-ENTMCNC: 27.8 PG — SIGNIFICANT CHANGE UP (ref 27–34)
MCHC RBC-ENTMCNC: 27.9 PG — SIGNIFICANT CHANGE UP (ref 27–34)
MCHC RBC-ENTMCNC: 28 PG — SIGNIFICANT CHANGE UP (ref 27–34)
MCHC RBC-ENTMCNC: 28 PG — SIGNIFICANT CHANGE UP (ref 27–34)
MCHC RBC-ENTMCNC: 28.2 PG — SIGNIFICANT CHANGE UP (ref 27–34)
MCHC RBC-ENTMCNC: 28.3 PG — SIGNIFICANT CHANGE UP (ref 27–34)
MCHC RBC-ENTMCNC: 29.3 PG
MCHC RBC-ENTMCNC: 29.8 GM/DL
MCHC RBC-ENTMCNC: 31.5 GM/DL
MCHC RBC-ENTMCNC: 31.9 GM/DL — LOW (ref 32–36)
MCHC RBC-ENTMCNC: 32 GM/DL — SIGNIFICANT CHANGE UP (ref 32–36)
MCHC RBC-ENTMCNC: 32.1 GM/DL — SIGNIFICANT CHANGE UP (ref 32–36)
MCHC RBC-ENTMCNC: 32.5 GM/DL — SIGNIFICANT CHANGE UP (ref 32–36)
MCHC RBC-ENTMCNC: 32.5 GM/DL — SIGNIFICANT CHANGE UP (ref 32–36)
MCHC RBC-ENTMCNC: 32.7 GM/DL — SIGNIFICANT CHANGE UP (ref 32–36)
MCV RBC AUTO: 85.1 FL — SIGNIFICANT CHANGE UP (ref 80–100)
MCV RBC AUTO: 85.6 FL — SIGNIFICANT CHANGE UP (ref 80–100)
MCV RBC AUTO: 85.9 FL — SIGNIFICANT CHANGE UP (ref 80–100)
MCV RBC AUTO: 85.9 FL — SIGNIFICANT CHANGE UP (ref 80–100)
MCV RBC AUTO: 86.2 FL — SIGNIFICANT CHANGE UP (ref 80–100)
MCV RBC AUTO: 86.7 FL — SIGNIFICANT CHANGE UP (ref 80–100)
MCV RBC AUTO: 86.9 FL — SIGNIFICANT CHANGE UP (ref 80–100)
MCV RBC AUTO: 87.1 FL — SIGNIFICANT CHANGE UP (ref 80–100)
MCV RBC AUTO: 87.8 FL — SIGNIFICANT CHANGE UP (ref 80–100)
MCV RBC AUTO: 92.1 FL
MCV RBC AUTO: 93.1 FL
MONOCYTES # BLD AUTO: 0.71 K/UL
MONOCYTES # BLD AUTO: 1.21 K/UL — HIGH (ref 0–0.9)
MONOCYTES # BLD AUTO: 1.27 K/UL — HIGH (ref 0–0.9)
MONOCYTES # BLD AUTO: 1.38 K/UL — HIGH (ref 0–0.9)
MONOCYTES # BLD AUTO: 1.39 K/UL — HIGH (ref 0–0.9)
MONOCYTES # BLD AUTO: 1.41 K/UL — HIGH (ref 0–0.9)
MONOCYTES # BLD AUTO: 1.49 K/UL
MONOCYTES # BLD AUTO: 1.59 K/UL — HIGH (ref 0–0.9)
MONOCYTES NFR BLD AUTO: 10 % — SIGNIFICANT CHANGE UP (ref 2–14)
MONOCYTES NFR BLD AUTO: 10.6 % — SIGNIFICANT CHANGE UP (ref 2–14)
MONOCYTES NFR BLD AUTO: 10.9 % — SIGNIFICANT CHANGE UP (ref 2–14)
MONOCYTES NFR BLD AUTO: 11.5 % — SIGNIFICANT CHANGE UP (ref 2–14)
MONOCYTES NFR BLD AUTO: 11.8 % — SIGNIFICANT CHANGE UP (ref 2–14)
MONOCYTES NFR BLD AUTO: 9.1 % — SIGNIFICANT CHANGE UP (ref 2–14)
MONOCYTES NFR BLD AUTO: 9.5 %
MONOCYTES NFR BLD AUTO: 9.8 %
NEUTROPHILS # BLD AUTO: 3.92 K/UL
NEUTROPHILS # BLD AUTO: 6.98 K/UL — SIGNIFICANT CHANGE UP (ref 1.8–7.4)
NEUTROPHILS # BLD AUTO: 8.04 K/UL — HIGH (ref 1.8–7.4)
NEUTROPHILS # BLD AUTO: 8.06 K/UL — HIGH (ref 1.8–7.4)
NEUTROPHILS # BLD AUTO: 8.86 K/UL — HIGH (ref 1.8–7.4)
NEUTROPHILS # BLD AUTO: 8.93 K/UL — HIGH (ref 1.8–7.4)
NEUTROPHILS # BLD AUTO: 9.18 K/UL — HIGH (ref 1.8–7.4)
NEUTROPHILS # BLD AUTO: 9.43 K/UL
NEUTROPHILS NFR BLD AUTO: 52.7 %
NEUTROPHILS NFR BLD AUTO: 55.6 % — SIGNIFICANT CHANGE UP (ref 43–77)
NEUTROPHILS NFR BLD AUTO: 61.7 %
NEUTROPHILS NFR BLD AUTO: 62.2 % — SIGNIFICANT CHANGE UP (ref 43–77)
NEUTROPHILS NFR BLD AUTO: 64.8 % — SIGNIFICANT CHANGE UP (ref 43–77)
NEUTROPHILS NFR BLD AUTO: 67.1 % — SIGNIFICANT CHANGE UP (ref 43–77)
NEUTROPHILS NFR BLD AUTO: 68.2 % — SIGNIFICANT CHANGE UP (ref 43–77)
NEUTROPHILS NFR BLD AUTO: 68.9 % — SIGNIFICANT CHANGE UP (ref 43–77)
NITRITE UR-MCNC: NEGATIVE — SIGNIFICANT CHANGE UP
NONHDLC SERPL-MCNC: 105 MG/DL
NONHDLC SERPL-MCNC: 60 MG/DL
NRBC # BLD: 0 /100 WBCS — SIGNIFICANT CHANGE UP (ref 0–0)
NRBC # FLD: 0 K/UL — SIGNIFICANT CHANGE UP (ref 0–0)
OB PNL STL: POSITIVE
PH UR: 6 — SIGNIFICANT CHANGE UP (ref 5–8)
PHOSPHATE SERPL-MCNC: 3.2 MG/DL — SIGNIFICANT CHANGE UP (ref 2.5–4.5)
PHOSPHATE SERPL-MCNC: 3.3 MG/DL — SIGNIFICANT CHANGE UP (ref 2.5–4.5)
PHOSPHATE SERPL-MCNC: 3.3 MG/DL — SIGNIFICANT CHANGE UP (ref 2.5–4.5)
PHOSPHATE SERPL-MCNC: 3.4 MG/DL — SIGNIFICANT CHANGE UP (ref 2.5–4.5)
PHOSPHATE SERPL-MCNC: 3.4 MG/DL — SIGNIFICANT CHANGE UP (ref 2.5–4.5)
PHOSPHATE SERPL-MCNC: 3.6 MG/DL — SIGNIFICANT CHANGE UP (ref 2.5–4.5)
PHOSPHATE SERPL-MCNC: 3.7 MG/DL — SIGNIFICANT CHANGE UP (ref 2.5–4.5)
PLATELET # BLD AUTO: 154 K/UL
PLATELET # BLD AUTO: 224 K/UL — SIGNIFICANT CHANGE UP (ref 150–400)
PLATELET # BLD AUTO: 238 K/UL — SIGNIFICANT CHANGE UP (ref 150–400)
PLATELET # BLD AUTO: 252 K/UL — SIGNIFICANT CHANGE UP (ref 150–400)
PLATELET # BLD AUTO: 253 K/UL — SIGNIFICANT CHANGE UP (ref 150–400)
PLATELET # BLD AUTO: 258 K/UL — SIGNIFICANT CHANGE UP (ref 150–400)
PLATELET # BLD AUTO: 273 K/UL — SIGNIFICANT CHANGE UP (ref 150–400)
PLATELET # BLD AUTO: 292 K/UL — SIGNIFICANT CHANGE UP (ref 150–400)
PLATELET # BLD AUTO: 304 K/UL — SIGNIFICANT CHANGE UP (ref 150–400)
PLATELET # BLD AUTO: 359 K/UL — SIGNIFICANT CHANGE UP (ref 150–400)
PLATELET # BLD AUTO: 379 K/UL
POTASSIUM SERPL-MCNC: 3.7 MMOL/L — SIGNIFICANT CHANGE UP (ref 3.5–5.3)
POTASSIUM SERPL-MCNC: 3.9 MMOL/L — SIGNIFICANT CHANGE UP (ref 3.5–5.3)
POTASSIUM SERPL-MCNC: 4.1 MMOL/L — SIGNIFICANT CHANGE UP (ref 3.5–5.3)
POTASSIUM SERPL-MCNC: 4.1 MMOL/L — SIGNIFICANT CHANGE UP (ref 3.5–5.3)
POTASSIUM SERPL-MCNC: 4.4 MMOL/L — SIGNIFICANT CHANGE UP (ref 3.5–5.3)
POTASSIUM SERPL-MCNC: 4.5 MMOL/L — SIGNIFICANT CHANGE UP (ref 3.5–5.3)
POTASSIUM SERPL-MCNC: 4.5 MMOL/L — SIGNIFICANT CHANGE UP (ref 3.5–5.3)
POTASSIUM SERPL-MCNC: 4.7 MMOL/L — SIGNIFICANT CHANGE UP (ref 3.5–5.3)
POTASSIUM SERPL-SCNC: 3.7 MMOL/L — SIGNIFICANT CHANGE UP (ref 3.5–5.3)
POTASSIUM SERPL-SCNC: 3.9 MMOL/L — SIGNIFICANT CHANGE UP (ref 3.5–5.3)
POTASSIUM SERPL-SCNC: 4.1 MMOL/L — SIGNIFICANT CHANGE UP (ref 3.5–5.3)
POTASSIUM SERPL-SCNC: 4.1 MMOL/L — SIGNIFICANT CHANGE UP (ref 3.5–5.3)
POTASSIUM SERPL-SCNC: 4.3 MMOL/L
POTASSIUM SERPL-SCNC: 4.4 MMOL/L — SIGNIFICANT CHANGE UP (ref 3.5–5.3)
POTASSIUM SERPL-SCNC: 4.5 MMOL/L
POTASSIUM SERPL-SCNC: 4.5 MMOL/L — SIGNIFICANT CHANGE UP (ref 3.5–5.3)
POTASSIUM SERPL-SCNC: 4.5 MMOL/L — SIGNIFICANT CHANGE UP (ref 3.5–5.3)
POTASSIUM SERPL-SCNC: 4.7 MMOL/L
POTASSIUM SERPL-SCNC: 4.7 MMOL/L — SIGNIFICANT CHANGE UP (ref 3.5–5.3)
PROT SERPL-MCNC: 4.8 G/DL — LOW (ref 6–8.3)
PROT SERPL-MCNC: 5.2 G/DL — LOW (ref 6–8.3)
PROT SERPL-MCNC: 5.3 G/DL
PROT SERPL-MCNC: 5.4 G/DL — LOW (ref 6–8.3)
PROT SERPL-MCNC: 5.4 G/DL — LOW (ref 6–8.3)
PROT SERPL-MCNC: 5.6 G/DL — LOW (ref 6–8.3)
PROT SERPL-MCNC: 5.6 G/DL — LOW (ref 6–8.3)
PROT SERPL-MCNC: 5.8 G/DL — LOW (ref 6–8.3)
PROT SERPL-MCNC: 6 G/DL — SIGNIFICANT CHANGE UP (ref 6–8.3)
PROT SERPL-MCNC: 6.1 G/DL
PROT SERPL-MCNC: 6.6 G/DL
PROT UR-MCNC: 30 MG/DL
PROTHROM AB SERPL-ACNC: 11.9 SEC — SIGNIFICANT CHANGE UP (ref 9.5–13)
PROTHROM AB SERPL-ACNC: 15.3 SEC — HIGH (ref 9.5–13)
PSA FLD-MCNC: 345 NG/ML — HIGH (ref 0–4)
RAPID RVP RESULT: SIGNIFICANT CHANGE UP
RBC # BLD: 3.6 M/UL — LOW (ref 4.2–5.8)
RBC # BLD: 3.68 M/UL
RBC # BLD: 3.69 M/UL — LOW (ref 4.2–5.8)
RBC # BLD: 3.69 M/UL — LOW (ref 4.2–5.8)
RBC # BLD: 3.7 M/UL — LOW (ref 4.2–5.8)
RBC # BLD: 3.73 M/UL — LOW (ref 4.2–5.8)
RBC # BLD: 3.75 M/UL
RBC # BLD: 3.75 M/UL — LOW (ref 4.2–5.8)
RBC # BLD: 3.76 M/UL — LOW (ref 4.2–5.8)
RBC # BLD: 3.78 M/UL — LOW (ref 4.2–5.8)
RBC # BLD: 4.05 M/UL — LOW (ref 4.2–5.8)
RBC # FLD: 15.5 % — HIGH (ref 10.3–14.5)
RBC # FLD: 15.6 %
RBC # FLD: 15.7 % — HIGH (ref 10.3–14.5)
RBC # FLD: 15.7 % — HIGH (ref 10.3–14.5)
RBC # FLD: 15.8 % — HIGH (ref 10.3–14.5)
RBC # FLD: 15.9 % — HIGH (ref 10.3–14.5)
RBC # FLD: 15.9 % — HIGH (ref 10.3–14.5)
RBC # FLD: 16.2 % — HIGH (ref 10.3–14.5)
RBC # FLD: 17 %
RBC CASTS # UR COMP ASSIST: 2 /HPF — SIGNIFICANT CHANGE UP (ref 0–4)
REVIEW: SIGNIFICANT CHANGE UP
RH IG SCN BLD-IMP: POSITIVE — SIGNIFICANT CHANGE UP
RSV RNA SPEC QL NAA+PROBE: SIGNIFICANT CHANGE UP
RV+EV RNA SPEC QL NAA+PROBE: SIGNIFICANT CHANGE UP
SARS-COV-2 RNA SPEC QL NAA+PROBE: SIGNIFICANT CHANGE UP
SODIUM SERPL-SCNC: 134 MMOL/L — LOW (ref 135–145)
SODIUM SERPL-SCNC: 135 MMOL/L — SIGNIFICANT CHANGE UP (ref 135–145)
SODIUM SERPL-SCNC: 136 MMOL/L — SIGNIFICANT CHANGE UP (ref 135–145)
SODIUM SERPL-SCNC: 137 MMOL/L — SIGNIFICANT CHANGE UP (ref 135–145)
SODIUM SERPL-SCNC: 138 MMOL/L
SODIUM SERPL-SCNC: 138 MMOL/L — SIGNIFICANT CHANGE UP (ref 135–145)
SODIUM SERPL-SCNC: 139 MMOL/L — SIGNIFICANT CHANGE UP (ref 135–145)
SODIUM SERPL-SCNC: 140 MMOL/L
SODIUM SERPL-SCNC: 141 MMOL/L
SP GR SPEC: 1.08 — HIGH (ref 1–1.03)
SPECIMEN SOURCE: SIGNIFICANT CHANGE UP
SPECIMEN SOURCE: SIGNIFICANT CHANGE UP
SQUAMOUS # UR AUTO: 3 /HPF — SIGNIFICANT CHANGE UP (ref 0–5)
SURGICAL PATHOLOGY STUDY: SIGNIFICANT CHANGE UP
T3 SERPL-MCNC: 77 NG/DL
T3 SERPL-MCNC: 92 NG/DL
T3FREE SERPL-MCNC: 2.26 PG/ML
T3FREE SERPL-MCNC: 2.51 PG/ML
T3RU NFR SERPL: 0.9 TBI
T3RU NFR SERPL: 1 TBI
T4 FREE SERPL-MCNC: 1.4 NG/DL
T4 FREE SERPL-MCNC: 1.4 NG/DL
T4 SERPL-MCNC: 7.7 UG/DL
T4 SERPL-MCNC: 8.5 UG/DL
TIBC SERPL-MCNC: 209 UG/DL — LOW (ref 220–430)
TRIGL SERPL-MCNC: 53 MG/DL
TRIGL SERPL-MCNC: 76 MG/DL
TSH SERPL-ACNC: 0.97 UIU/ML
TSH SERPL-ACNC: 1.27 UIU/ML
UIBC SERPL-MCNC: 192 UG/DL — SIGNIFICANT CHANGE UP (ref 110–370)
URATE SERPL-MCNC: 5 MG/DL — SIGNIFICANT CHANGE UP (ref 3.4–8.8)
UROBILINOGEN FLD QL: 1 MG/DL — SIGNIFICANT CHANGE UP (ref 0.2–1)
VIT B12 SERPL-MCNC: 728 PG/ML
VIT B12 SERPL-MCNC: 806 PG/ML
VIT B12 SERPL-MCNC: >2000 PG/ML — HIGH (ref 200–900)
WBC # BLD: 10.77 K/UL — HIGH (ref 3.8–10.5)
WBC # BLD: 11.26 K/UL — HIGH (ref 3.8–10.5)
WBC # BLD: 11.98 K/UL — HIGH (ref 3.8–10.5)
WBC # BLD: 12.06 K/UL — HIGH (ref 3.8–10.5)
WBC # BLD: 12.98 K/UL — HIGH (ref 3.8–10.5)
WBC # BLD: 13.09 K/UL — HIGH (ref 3.8–10.5)
WBC # BLD: 13.3 K/UL — HIGH (ref 3.8–10.5)
WBC # BLD: 14.45 K/UL — HIGH (ref 3.8–10.5)
WBC # BLD: 15.94 K/UL — HIGH (ref 3.8–10.5)
WBC # FLD AUTO: 10.77 K/UL — HIGH (ref 3.8–10.5)
WBC # FLD AUTO: 11.26 K/UL — HIGH (ref 3.8–10.5)
WBC # FLD AUTO: 11.98 K/UL — HIGH (ref 3.8–10.5)
WBC # FLD AUTO: 12.06 K/UL — HIGH (ref 3.8–10.5)
WBC # FLD AUTO: 12.98 K/UL — HIGH (ref 3.8–10.5)
WBC # FLD AUTO: 13.09 K/UL — HIGH (ref 3.8–10.5)
WBC # FLD AUTO: 13.3 K/UL — HIGH (ref 3.8–10.5)
WBC # FLD AUTO: 14.45 K/UL — HIGH (ref 3.8–10.5)
WBC # FLD AUTO: 15.26 K/UL
WBC # FLD AUTO: 15.94 K/UL — HIGH (ref 3.8–10.5)
WBC # FLD AUTO: 7.45 K/UL
WBC UR QL: 0 /HPF — SIGNIFICANT CHANGE UP (ref 0–5)

## 2023-01-01 PROCEDURE — 93296 REM INTERROG EVL PM/IDS: CPT

## 2023-01-01 PROCEDURE — 99285 EMERGENCY DEPT VISIT HI MDM: CPT

## 2023-01-01 PROCEDURE — 99232 SBSQ HOSP IP/OBS MODERATE 35: CPT

## 2023-01-01 PROCEDURE — 99232 SBSQ HOSP IP/OBS MODERATE 35: CPT | Mod: GC

## 2023-01-01 PROCEDURE — 99497 ADVNCD CARE PLAN 30 MIN: CPT | Mod: 25

## 2023-01-01 PROCEDURE — ZZZZZ: CPT

## 2023-01-01 PROCEDURE — 99214 OFFICE O/P EST MOD 30 MIN: CPT

## 2023-01-01 PROCEDURE — 99239 HOSP IP/OBS DSCHRG MGMT >30: CPT | Mod: GC

## 2023-01-01 PROCEDURE — 99222 1ST HOSP IP/OBS MODERATE 55: CPT

## 2023-01-01 PROCEDURE — 93000 ELECTROCARDIOGRAM COMPLETE: CPT

## 2023-01-01 PROCEDURE — 77012 CT SCAN FOR NEEDLE BIOPSY: CPT | Mod: 26

## 2023-01-01 PROCEDURE — 99223 1ST HOSP IP/OBS HIGH 75: CPT | Mod: GC

## 2023-01-01 PROCEDURE — 93294 REM INTERROG EVL PM/LDLS PM: CPT

## 2023-01-01 PROCEDURE — 36415 COLL VENOUS BLD VENIPUNCTURE: CPT

## 2023-01-01 PROCEDURE — 93000 ELECTROCARDIOGRAM COMPLETE: CPT | Mod: XU

## 2023-01-01 PROCEDURE — 88341 IMHCHEM/IMCYTCHM EA ADD ANTB: CPT | Mod: 26

## 2023-01-01 PROCEDURE — 93880 EXTRACRANIAL BILAT STUDY: CPT

## 2023-01-01 PROCEDURE — 93288 INTERROG EVL PM/LDLS PM IP: CPT

## 2023-01-01 PROCEDURE — 88342 IMHCHEM/IMCYTCHM 1ST ANTB: CPT | Mod: 26

## 2023-01-01 PROCEDURE — 20220 BONE BIOPSY TROCAR/NDL SUPFC: CPT

## 2023-01-01 PROCEDURE — 88307 TISSUE EXAM BY PATHOLOGIST: CPT | Mod: 26

## 2023-01-01 PROCEDURE — 93306 TTE W/DOPPLER COMPLETE: CPT

## 2023-01-01 PROCEDURE — 74174 CTA ABD&PLVS W/CONTRAST: CPT | Mod: 26,MA

## 2023-01-01 PROCEDURE — 99223 1ST HOSP IP/OBS HIGH 75: CPT

## 2023-01-01 PROCEDURE — 88311 DECALCIFY TISSUE: CPT | Mod: 26

## 2023-01-01 PROCEDURE — 71260 CT THORAX DX C+: CPT | Mod: 26,MA

## 2023-01-01 RX ORDER — ATORVASTATIN CALCIUM 80 MG/1
1 TABLET, FILM COATED ORAL
Refills: 0 | DISCHARGE

## 2023-01-01 RX ORDER — CARVEDILOL 3.12 MG/1
3.12 TABLET, FILM COATED ORAL TWICE DAILY
Qty: 60 | Refills: 3 | Status: ACTIVE | COMMUNITY
Start: 2023-01-01 | End: 1900-01-01

## 2023-01-01 RX ORDER — ALBUTEROL SULFATE 90 UG/1
108 (90 BASE) INHALANT RESPIRATORY (INHALATION)
Qty: 8.5 | Refills: 3 | Status: ACTIVE | COMMUNITY
Start: 2022-05-25 | End: 1900-01-01

## 2023-01-01 RX ORDER — POLYETHYLENE GLYCOL 3350 17 G/17G
17 POWDER, FOR SOLUTION ORAL
Qty: 0 | Refills: 0 | DISCHARGE
Start: 2023-01-01

## 2023-01-01 RX ORDER — OXYCODONE HYDROCHLORIDE 5 MG/1
5 TABLET ORAL EVERY 6 HOURS
Refills: 0 | Status: DISCONTINUED | OUTPATIENT
Start: 2023-01-01 | End: 2023-01-01

## 2023-01-01 RX ORDER — SOD SULF/SODIUM/NAHCO3/KCL/PEG
2000 SOLUTION, RECONSTITUTED, ORAL ORAL ONCE
Refills: 0 | Status: DISCONTINUED | OUTPATIENT
Start: 2023-01-01 | End: 2023-01-01

## 2023-01-01 RX ORDER — ALBUTEROL 90 UG/1
2 AEROSOL, METERED ORAL
Refills: 0 | DISCHARGE

## 2023-01-01 RX ORDER — CARVEDILOL PHOSPHATE 80 MG/1
1 CAPSULE, EXTENDED RELEASE ORAL
Refills: 0 | DISCHARGE

## 2023-01-01 RX ORDER — TRAMADOL HYDROCHLORIDE 50 MG/1
50 TABLET, COATED ORAL TWICE DAILY
Qty: 60 | Refills: 3 | Status: ACTIVE | COMMUNITY
Start: 2021-11-10 | End: 1900-01-01

## 2023-01-01 RX ORDER — POLYETHYLENE GLYCOL 3350 17 G/17G
17 POWDER, FOR SOLUTION ORAL
Qty: 476 | Refills: 0
Start: 2023-01-01 | End: 2023-01-01

## 2023-01-01 RX ORDER — SERTRALINE 25 MG/1
25 TABLET, FILM COATED ORAL DAILY
Refills: 0 | Status: DISCONTINUED | OUTPATIENT
Start: 2023-01-01 | End: 2023-01-01

## 2023-01-01 RX ORDER — PANTOPRAZOLE SODIUM 20 MG/1
80 TABLET, DELAYED RELEASE ORAL ONCE
Refills: 0 | Status: COMPLETED | OUTPATIENT
Start: 2023-01-01 | End: 2023-01-01

## 2023-01-01 RX ORDER — POLYETHYLENE GLYCOL 3350 17 G/17G
17 POWDER, FOR SOLUTION ORAL
Refills: 0 | Status: DISCONTINUED | OUTPATIENT
Start: 2023-01-01 | End: 2023-01-01

## 2023-01-01 RX ORDER — IPRATROPIUM/ALBUTEROL SULFATE 18-103MCG
3 AEROSOL WITH ADAPTER (GRAM) INHALATION EVERY 6 HOURS
Refills: 0 | Status: DISCONTINUED | OUTPATIENT
Start: 2023-01-01 | End: 2023-01-01

## 2023-01-01 RX ORDER — IPRATROPIUM/ALBUTEROL SULFATE 18-103MCG
3 AEROSOL WITH ADAPTER (GRAM) INHALATION
Refills: 0 | DISCHARGE

## 2023-01-01 RX ORDER — LOSARTAN/HYDROCHLOROTHIAZIDE 100MG-25MG
1 TABLET ORAL
Refills: 0 | DISCHARGE

## 2023-01-01 RX ORDER — APIXABAN 5 MG/1
5 TABLET, FILM COATED ORAL
Qty: 180 | Refills: 3 | Status: ACTIVE | COMMUNITY
Start: 2022-03-07

## 2023-01-01 RX ORDER — MENTHOL/CETYLPYRD CL 4.5 MG
5.4 LOZENGE MUCOUS MEMBRANE
Qty: 1 | Refills: 3 | Status: DISCONTINUED | COMMUNITY
Start: 2023-01-01 | End: 2023-01-01

## 2023-01-01 RX ORDER — OXYCODONE HYDROCHLORIDE 5 MG/1
2.5 TABLET ORAL EVERY 6 HOURS
Refills: 0 | Status: DISCONTINUED | OUTPATIENT
Start: 2023-01-01 | End: 2023-01-01

## 2023-01-01 RX ORDER — CEFTRIAXONE 500 MG/1
1000 INJECTION, POWDER, FOR SOLUTION INTRAMUSCULAR; INTRAVENOUS EVERY 24 HOURS
Refills: 0 | Status: DISCONTINUED | OUTPATIENT
Start: 2023-01-01 | End: 2023-01-01

## 2023-01-01 RX ORDER — ACETAMINOPHEN 500 MG
1000 TABLET ORAL ONCE
Refills: 0 | Status: COMPLETED | OUTPATIENT
Start: 2023-01-01 | End: 2023-01-01

## 2023-01-01 RX ORDER — ACETAMINOPHEN 500 MG
650 TABLET ORAL ONCE
Refills: 0 | Status: COMPLETED | OUTPATIENT
Start: 2023-01-01 | End: 2023-01-01

## 2023-01-01 RX ORDER — ALBUTEROL 90 UG/1
2 AEROSOL, METERED ORAL EVERY 6 HOURS
Refills: 0 | Status: DISCONTINUED | OUTPATIENT
Start: 2023-01-01 | End: 2023-01-01

## 2023-01-01 RX ORDER — SERTRALINE 25 MG/1
1 TABLET, FILM COATED ORAL
Refills: 0 | DISCHARGE

## 2023-01-01 RX ORDER — ATORVASTATIN CALCIUM 80 MG/1
20 TABLET, FILM COATED ORAL AT BEDTIME
Refills: 0 | Status: DISCONTINUED | OUTPATIENT
Start: 2023-01-01 | End: 2023-01-01

## 2023-01-01 RX ORDER — LANOLIN ALCOHOL/MO/W.PET/CERES
3 CREAM (GRAM) TOPICAL AT BEDTIME
Refills: 0 | Status: DISCONTINUED | OUTPATIENT
Start: 2023-01-01 | End: 2023-01-01

## 2023-01-01 RX ORDER — ACETAMINOPHEN 500 MG
650 TABLET ORAL EVERY 6 HOURS
Refills: 0 | Status: DISCONTINUED | OUTPATIENT
Start: 2023-01-01 | End: 2023-01-01

## 2023-01-01 RX ORDER — ATORVASTATIN CALCIUM 20 MG/1
20 TABLET, FILM COATED ORAL
Qty: 90 | Refills: 1 | Status: ACTIVE | COMMUNITY
Start: 2017-07-06 | End: 1900-01-01

## 2023-01-01 RX ORDER — SENNA PLUS 8.6 MG/1
2 TABLET ORAL DAILY
Refills: 0 | Status: DISCONTINUED | OUTPATIENT
Start: 2023-01-01 | End: 2023-01-01

## 2023-01-01 RX ORDER — INFLUENZA VIRUS VACCINE 15; 15; 15; 15 UG/.5ML; UG/.5ML; UG/.5ML; UG/.5ML
0.7 SUSPENSION INTRAMUSCULAR ONCE
Refills: 0 | Status: DISCONTINUED | OUTPATIENT
Start: 2023-01-01 | End: 2023-01-01

## 2023-01-01 RX ORDER — APIXABAN 2.5 MG/1
1 TABLET, FILM COATED ORAL
Refills: 0 | DISCHARGE

## 2023-01-01 RX ORDER — ENOXAPARIN SODIUM 100 MG/ML
40 INJECTION SUBCUTANEOUS EVERY 24 HOURS
Refills: 0 | Status: DISCONTINUED | OUTPATIENT
Start: 2023-01-01 | End: 2023-01-01

## 2023-01-01 RX ORDER — LOSARTAN POTASSIUM AND HYDROCHLOROTHIAZIDE 12.5; 5 MG/1; MG/1
50-12.5 TABLET ORAL
Qty: 90 | Refills: 1 | Status: ACTIVE | COMMUNITY
Start: 2017-01-18 | End: 1900-01-01

## 2023-01-01 RX ADMIN — Medication 400 MILLIGRAM(S): at 02:22

## 2023-01-01 RX ADMIN — SERTRALINE 25 MILLIGRAM(S): 25 TABLET, FILM COATED ORAL at 11:57

## 2023-01-01 RX ADMIN — Medication 200 MILLIGRAM(S): at 23:15

## 2023-01-01 RX ADMIN — Medication 3 MILLILITER(S): at 16:11

## 2023-01-01 RX ADMIN — Medication 3 MILLILITER(S): at 11:00

## 2023-01-01 RX ADMIN — Medication 200 MILLIGRAM(S): at 17:29

## 2023-01-01 RX ADMIN — POLYETHYLENE GLYCOL 3350 17 GRAM(S): 17 POWDER, FOR SOLUTION ORAL at 07:06

## 2023-01-01 RX ADMIN — Medication 3 MILLIGRAM(S): at 22:00

## 2023-01-01 RX ADMIN — ATORVASTATIN CALCIUM 20 MILLIGRAM(S): 80 TABLET, FILM COATED ORAL at 21:49

## 2023-01-01 RX ADMIN — Medication 200 MILLIGRAM(S): at 17:44

## 2023-01-01 RX ADMIN — Medication 3 MILLILITER(S): at 21:46

## 2023-01-01 RX ADMIN — ATORVASTATIN CALCIUM 20 MILLIGRAM(S): 80 TABLET, FILM COATED ORAL at 23:11

## 2023-01-01 RX ADMIN — Medication 3 MILLIGRAM(S): at 21:53

## 2023-01-01 RX ADMIN — Medication 200 MILLIGRAM(S): at 11:42

## 2023-01-01 RX ADMIN — ATORVASTATIN CALCIUM 20 MILLIGRAM(S): 80 TABLET, FILM COATED ORAL at 22:17

## 2023-01-01 RX ADMIN — Medication 200 MILLIGRAM(S): at 18:23

## 2023-01-01 RX ADMIN — OXYCODONE HYDROCHLORIDE 5 MILLIGRAM(S): 5 TABLET ORAL at 19:10

## 2023-01-01 RX ADMIN — Medication 3 MILLILITER(S): at 03:35

## 2023-01-01 RX ADMIN — POLYETHYLENE GLYCOL 3350 17 GRAM(S): 17 POWDER, FOR SOLUTION ORAL at 07:44

## 2023-01-01 RX ADMIN — Medication 200 MILLIGRAM(S): at 06:28

## 2023-01-01 RX ADMIN — Medication 3 MILLIGRAM(S): at 21:50

## 2023-01-01 RX ADMIN — ENOXAPARIN SODIUM 40 MILLIGRAM(S): 100 INJECTION SUBCUTANEOUS at 11:42

## 2023-01-01 RX ADMIN — Medication 200 MILLIGRAM(S): at 01:06

## 2023-01-01 RX ADMIN — SENNA PLUS 2 TABLET(S): 8.6 TABLET ORAL at 11:56

## 2023-01-01 RX ADMIN — Medication 200 MILLIGRAM(S): at 17:47

## 2023-01-01 RX ADMIN — Medication 200 MILLIGRAM(S): at 11:57

## 2023-01-01 RX ADMIN — POLYETHYLENE GLYCOL 3350 17 GRAM(S): 17 POWDER, FOR SOLUTION ORAL at 05:36

## 2023-01-01 RX ADMIN — PANTOPRAZOLE SODIUM 80 MILLIGRAM(S): 20 TABLET, DELAYED RELEASE ORAL at 04:18

## 2023-01-01 RX ADMIN — Medication 200 MILLIGRAM(S): at 00:32

## 2023-01-01 RX ADMIN — POLYETHYLENE GLYCOL 3350 17 GRAM(S): 17 POWDER, FOR SOLUTION ORAL at 05:22

## 2023-01-01 RX ADMIN — Medication 200 MILLIGRAM(S): at 07:44

## 2023-01-01 RX ADMIN — SENNA PLUS 2 TABLET(S): 8.6 TABLET ORAL at 11:49

## 2023-01-01 RX ADMIN — SERTRALINE 25 MILLIGRAM(S): 25 TABLET, FILM COATED ORAL at 12:40

## 2023-01-01 RX ADMIN — POLYETHYLENE GLYCOL 3350 17 GRAM(S): 17 POWDER, FOR SOLUTION ORAL at 17:29

## 2023-01-01 RX ADMIN — Medication 3 MILLILITER(S): at 11:50

## 2023-01-01 RX ADMIN — Medication 650 MILLIGRAM(S): at 23:47

## 2023-01-01 RX ADMIN — Medication 650 MILLIGRAM(S): at 22:47

## 2023-01-01 RX ADMIN — Medication 650 MILLIGRAM(S): at 12:42

## 2023-01-01 RX ADMIN — OXYCODONE HYDROCHLORIDE 5 MILLIGRAM(S): 5 TABLET ORAL at 18:19

## 2023-01-01 RX ADMIN — SERTRALINE 25 MILLIGRAM(S): 25 TABLET, FILM COATED ORAL at 11:50

## 2023-01-01 RX ADMIN — Medication 200 MILLIGRAM(S): at 00:03

## 2023-01-01 RX ADMIN — Medication 3 MILLIGRAM(S): at 21:38

## 2023-01-01 RX ADMIN — OXYCODONE HYDROCHLORIDE 5 MILLIGRAM(S): 5 TABLET ORAL at 19:32

## 2023-01-01 RX ADMIN — SERTRALINE 25 MILLIGRAM(S): 25 TABLET, FILM COATED ORAL at 11:42

## 2023-01-01 RX ADMIN — SERTRALINE 25 MILLIGRAM(S): 25 TABLET, FILM COATED ORAL at 11:21

## 2023-01-01 RX ADMIN — OXYCODONE HYDROCHLORIDE 2.5 MILLIGRAM(S): 5 TABLET ORAL at 01:13

## 2023-01-01 RX ADMIN — SERTRALINE 25 MILLIGRAM(S): 25 TABLET, FILM COATED ORAL at 11:52

## 2023-01-01 RX ADMIN — Medication 3 MILLIGRAM(S): at 21:28

## 2023-01-01 RX ADMIN — Medication 3 MILLIGRAM(S): at 22:17

## 2023-01-01 RX ADMIN — SENNA PLUS 2 TABLET(S): 8.6 TABLET ORAL at 12:40

## 2023-01-01 RX ADMIN — Medication 3 MILLILITER(S): at 03:33

## 2023-01-01 RX ADMIN — POLYETHYLENE GLYCOL 3350 17 GRAM(S): 17 POWDER, FOR SOLUTION ORAL at 17:48

## 2023-01-01 RX ADMIN — OXYCODONE HYDROCHLORIDE 5 MILLIGRAM(S): 5 TABLET ORAL at 10:09

## 2023-01-01 RX ADMIN — ATORVASTATIN CALCIUM 20 MILLIGRAM(S): 80 TABLET, FILM COATED ORAL at 21:53

## 2023-01-01 RX ADMIN — Medication 200 MILLIGRAM(S): at 11:48

## 2023-01-01 RX ADMIN — OXYCODONE HYDROCHLORIDE 5 MILLIGRAM(S): 5 TABLET ORAL at 11:09

## 2023-01-01 RX ADMIN — Medication 1000 MILLIGRAM(S): at 02:37

## 2023-01-01 RX ADMIN — Medication 200 MILLIGRAM(S): at 11:52

## 2023-01-01 RX ADMIN — CEFTRIAXONE 100 MILLIGRAM(S): 500 INJECTION, POWDER, FOR SOLUTION INTRAMUSCULAR; INTRAVENOUS at 21:53

## 2023-01-01 RX ADMIN — Medication 200 MILLIGRAM(S): at 05:27

## 2023-01-01 RX ADMIN — Medication 200 MILLIGRAM(S): at 17:28

## 2023-01-01 RX ADMIN — Medication 650 MILLIGRAM(S): at 11:42

## 2023-01-01 RX ADMIN — ATORVASTATIN CALCIUM 20 MILLIGRAM(S): 80 TABLET, FILM COATED ORAL at 23:04

## 2023-01-01 RX ADMIN — ENOXAPARIN SODIUM 40 MILLIGRAM(S): 100 INJECTION SUBCUTANEOUS at 11:48

## 2023-01-01 RX ADMIN — Medication 200 MILLIGRAM(S): at 05:28

## 2023-01-01 RX ADMIN — OXYCODONE HYDROCHLORIDE 2.5 MILLIGRAM(S): 5 TABLET ORAL at 21:47

## 2023-01-01 RX ADMIN — Medication 200 MILLIGRAM(S): at 11:21

## 2023-01-01 RX ADMIN — Medication 3 MILLILITER(S): at 21:52

## 2023-01-01 RX ADMIN — ATORVASTATIN CALCIUM 20 MILLIGRAM(S): 80 TABLET, FILM COATED ORAL at 21:24

## 2023-01-01 RX ADMIN — Medication 3 MILLIGRAM(S): at 23:15

## 2023-01-01 RX ADMIN — ATORVASTATIN CALCIUM 20 MILLIGRAM(S): 80 TABLET, FILM COATED ORAL at 21:38

## 2023-01-01 RX ADMIN — SENNA PLUS 2 TABLET(S): 8.6 TABLET ORAL at 11:53

## 2023-01-01 RX ADMIN — OXYCODONE HYDROCHLORIDE 2.5 MILLIGRAM(S): 5 TABLET ORAL at 20:47

## 2023-01-01 RX ADMIN — Medication 3 MILLIGRAM(S): at 23:03

## 2023-01-01 RX ADMIN — ATORVASTATIN CALCIUM 20 MILLIGRAM(S): 80 TABLET, FILM COATED ORAL at 21:59

## 2023-01-01 RX ADMIN — OXYCODONE HYDROCHLORIDE 2.5 MILLIGRAM(S): 5 TABLET ORAL at 00:13

## 2023-02-15 NOTE — END OF VISIT
[Time Spent: ___ minutes] : I have spent [unfilled] minutes of time on the encounter. [FreeTextEntry3] : All medical record entries made by the Scribe were at my, Dr. Jairo Guerrero, direction and personally dictated by me on 02/15/2023. I have reviewed the chart and agree that the record accurately reflects my personal performance of the history, physical exam, assessment and plan. I have also personally directed, reviewed, and agreed with the chart.

## 2023-02-15 NOTE — PHYSICAL EXAM

## 2023-02-15 NOTE — DISCUSSION/SUMMARY
[Paroxysmal Atrial Fibrillation] : paroxysmal atrial fibrillation [Controlled Ventricular Response] : controlled ventricular response [Cardiomyopathy] : cardiomyopathy [Fluid Restriction] : fluid restriction [Coronary Artery Disease] : coronary artery disease [Dietary Modification] : dietary modification [Hypertension] : hypertension [Low Sodium Diet] : low sodium diet [NSAIDs Avoidance] : non-steroidal anti-inflammatory drugs avoidance [Mitral Regurgitation] : mitral regurgitation [None] : There are no changes in medication management [Sodium Restriction] : sodium restriction [Patient] : the patient [Stable] : stable [de-identified] : in paced rhythm. [FreeTextEntry1] : Blood work drawn. Maintain a low caloric, low sodium, low cholesterol diet. Dietary counseling given, diet and exercise discussed in detail.

## 2023-05-17 PROBLEM — Z20.822 SUSPECTED COVID-19 VIRUS INFECTION: Status: RESOLVED | Noted: 2020-07-16 | Resolved: 2023-01-01

## 2023-05-17 NOTE — HISTORY OF PRESENT ILLNESS
[FreeTextEntry1] : Denies Chest Pain, SOB, Dizziness, Leg edema, Orthopnea, PND, Palpitations, Syncope, DEWEY, Diaphoresis. \par Patient denies change in appetite, fatigue, heat/cold intolerance, myalgias, polydipsia, polyphagia, polyuria, tingling, numbness.

## 2023-05-17 NOTE — END OF VISIT
[FreeTextEntry3] : All medical record entries made by the Scribe were at my, Dr. Jairo Guerrero, direction and personally dictated by me on 05/17/2023. I have reviewed the chart and agree that the record accurately reflects my personal performance of the history, physical exam, assessment and plan. I have also personally directed, reviewed, and agreed with the chart.  [Time Spent: ___ minutes] : I have spent [unfilled] minutes of time on the encounter.

## 2023-05-17 NOTE — PHYSICAL EXAM

## 2023-05-17 NOTE — DISCUSSION/SUMMARY
[Atrial Fibrillation] : atrial fibrillation [Controlled Ventricular Response] : controlled ventricular response [Cardiomyopathy] : cardiomyopathy [Coronary Artery Disease] : coronary artery disease [Dietary Modification] : dietary modification [Hypertension] : hypertension [Low Sodium Diet] : low sodium diet [NSAIDs Avoidance] : non-steroidal anti-inflammatory drugs avoidance [Mitral Regurgitation] : mitral regurgitation [Stable] : stable [None] : There are no changes in medication management [Sodium Restriction] : sodium restriction [Patient] : the patient [FreeTextEntry1] : Vitamin D deficiency - Blood work reviewed with patient. Maintain a low caloric, low sodium, low cholesterol diet. Dietary counseling given, diet and exercise discussed in detail.

## 2023-09-05 PROBLEM — R63.4 WEIGHT LOSS, ABNORMAL: Status: ACTIVE | Noted: 2023-01-01

## 2023-09-05 NOTE — END OF VISIT
[FreeTextEntry3] : All medical record entries made by the Scribe were at my, Dr. Jairo Guerrero, direction and personally dictated by me on 09/05/2023. I have reviewed the chart and agree that the record accurately reflects my personal performance of the history, physical exam, assessment and plan. I have also personally directed, reviewed, and agreed with the chart.    [Time Spent: ___ minutes] : I have spent [unfilled] minutes of time on the encounter.

## 2023-09-05 NOTE — PROCEDURE
[Pacemaker] : pacemaker [VVIR] : VVIR [Longevity: ___ months] : The estimated remaining battery life is [unfilled] months [de-identified] : medtronic

## 2023-09-05 NOTE — HISTORY OF PRESENT ILLNESS
[FreeTextEntry1] : Pt reports profound weight loss since last visit. Wife states that despite encouraging the pt to go to the doctor, he refused to go up until today. Pt reports missing three of his front teeth due to a "bad dentist" and that has impacted his ability to eat. Denies Chest Pain, SOB, Dizziness, Leg edema, Orthopnea, PND, Palpitations, Syncope, DEWEY, Diaphoresis. Patient denies heat or cold intolerance, myalgias, polydipsia, polyphagia, polyuria, tingling, numbness.

## 2023-09-05 NOTE — DISCUSSION/SUMMARY
[Cardiomyopathy] : cardiomyopathy [Coronary Artery Disease] : coronary artery disease [Hypertension] : hypertension [Low Sodium Diet] : low sodium diet [NSAIDs Avoidance] : non-steroidal anti-inflammatory drugs avoidance [Mitral Regurgitation] : mitral regurgitation [Sodium Restriction] : sodium restriction [Atrial Fibrillation] : atrial fibrillation [Stable] : stable [None] : There are no changes in medication management [Patient] : the patient [de-identified] : in a paced rhythm [FreeTextEntry1] : PPM - Interrogation ordered. Vitamin D Deficiency - Blood work drawn. Maintain a low caloric, low sodium, low cholesterol diet. Dietary counseling given, diet and exercise discussed in detail. Weight loss- discussed possibility of occult malignancy in detail with pt., wife and additional family member. Pt. referred for CT scan chest/abd/pelvis. Referral to GI +/- Heme/Onc may be required based on results of CT scans and blood work drawn. [Pacemaker Function Normal] : normal pacemaker function

## 2023-09-05 NOTE — REASON FOR VISIT
[Hypertension] : hypertension [Coronary Artery Disease] : coronary artery disease [Other: ____] : [unfilled] [Follow-up Device Check] : is here today for a follow-up device check visit for

## 2023-09-05 NOTE — PROCEDURE
[Pacemaker] : pacemaker [VVIR] : VVIR [Longevity: ___ months] : The estimated remaining battery life is [unfilled] months [de-identified] : medtronic

## 2023-09-05 NOTE — PHYSICAL EXAM

## 2023-09-05 NOTE — PHYSICAL EXAM

## 2023-09-05 NOTE — DISCUSSION/SUMMARY
[Cardiomyopathy] : cardiomyopathy [Coronary Artery Disease] : coronary artery disease [Hypertension] : hypertension [Low Sodium Diet] : low sodium diet [NSAIDs Avoidance] : non-steroidal anti-inflammatory drugs avoidance [Mitral Regurgitation] : mitral regurgitation [Sodium Restriction] : sodium restriction [Atrial Fibrillation] : atrial fibrillation [Stable] : stable [None] : There are no changes in medication management [Patient] : the patient [de-identified] : in a paced rhythm [FreeTextEntry1] : PPM - Interrogation ordered. Vitamin D Deficiency - Blood work drawn. Maintain a low caloric, low sodium, low cholesterol diet. Dietary counseling given, diet and exercise discussed in detail. Weight loss- discussed possibility of occult malignancy in detail with pt., wife and additional family member. Pt. referred for CT scan chest/abd/pelvis. Referral to GI +/- Heme/Onc may be required based on results of CT scans and blood work drawn. [Pacemaker Function Normal] : normal pacemaker function

## 2023-09-18 NOTE — CONSULT NOTE ADULT - NS ATTEND AMEND GEN_ALL_CORE FT
87yo M with PMH of HTN, CAD S/P PCI stent in 2012- not currently on ASA, atrial fibrillation on Eliquis, high degree / complete AV block s/p PPM, and hx of hemorrhoids presents with hematochezia, found to have diffuse osseous mets on imaging. GI consulted for hematochezia.    # Hematochezia - in the setting of increased stool burden and distended rectum. H/H at baseline. Suspect stercoral colitis as the etiology vs. hemorrhoids vs. less likely AVMs, polyps, AVMs. Constipation likely in part due to a worsening prostate mass. Patient will benefit from aggressive bowel regimen as recommended above. Ultimately if within GOC can consider a colonoscopy once patient is towards discharge and inpatient workup of other active issues is completed.  # Heterogenous appearance of liver - unclear significance. Noted hemangioma as well which usually does not require further workup.   # ?Metastatic prostate cancer  # CAD  # PPM  # HFrEF - EF 30%  # Afib - eliquis held    Recommendations as above.

## 2023-09-18 NOTE — H&P ADULT - PROBLEM SELECTOR PLAN 1
Patient with bright red blood per rectum for 1 day. One potential prior episode of bleeding after found blood on bedsheet ~2 months ago. Takes eliquis at home for a-fib. Baseline hemoglobin 11-12; 10.6 on admission.  - Hgb this AM of 10.2 (9/18)  - Hold eliquis in the setting of LGIB.  - GI consulted; recs appreciated. No colonoscopy at this time.  - Smog enema ordered, bowel regimen with miralax and senna.  - PPI given in ED as unclear source for GI bleed.  - Maintain active type and screen, transfuse if hgb <7. Maintain 2 large bore IVs.  - Trend CBC q12h  - Initially NPO; as h/h stable, advance diet as tolerated.

## 2023-09-18 NOTE — ED ADULT NURSE NOTE - OBJECTIVE STATEMENT
pt aox4, states after his teeth was glued to gums with metal he began to experience pain to his rt side of face, headaches, and coccyx pain. pt also complains of blood in the stools onset 2 days. was recommended by pcp to come to the ER. pt noted with 1 episode of stool with blood streaks, redness to the sacrum. pt also reports sudden weight loss unsure of amount due to decreased appetite from  facial pain.

## 2023-09-18 NOTE — H&P ADULT - HISTORY OF PRESENT ILLNESS
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     Patient reports that he noted dark red blood in stool during bowel movements yesterday and today, soaking multiple towels. As per wife, patient had one episode of bleeding on bedsheet 2 months ago after taking one pill of Advil. Admits to rectal pain and urinary frequency during this time and currently as well. Notes that he has had a cough since a dental procedure in July. Admits to 20-30 pound weight loss during this time. Denies any fever, chills, n/v/d, abdominal pain, sob, palpitation, or any other complaints.      ED Course:  Vital Signs on Presentation:  Temp: 97.8 F ( 36.6 C) | BP: 129/65 mmHg | HR: 65 | RR: 16 | SpO2: 100%  Imaging: CT A/P showing heterogenous, enlarged prostate along with thick-walled appearance of bladder. Distention of the rectum with stool with associated rectal wall   thickening and inflammatory changes, consistent with stercoral colitis. Heterogeneous appearance of the liver and right hepatic lobe hemangioma. Diffuse osseous metastasis.  Medications Given: N/A           87 yo M  with HTN, CAD S/P PCI stent in 2012, atrial fibrillation on Eliquis, and high degree / complete AV block s/p PPM MDT implant 8/17/15, hemorrhoid presents to ED c/o bloody stools since yesterday.     Patient reports that he noted dark red blood in stool during bowel movements yesterday and today, soaking multiple towels. As per wife, patient had one episode of bleeding on bedsheet 2 months ago after taking one pill of Advil. Admits to rectal pain and urinary frequency during this time and currently as well. Notes that he has had a cough since a dental procedure in July. Admits to 20-30 pound weight loss during this time. Denies any fever, chills, n/v/d, abdominal pain, sob, palpitation, or any other complaints.    ED Course: Labs drawn, FOBT and UA sent  Vital Signs on Presentation:  Temp: 97.8 F ( 36.6 C) | BP: 129/65 mmHg | HR: 65 | RR: 16 | SpO2: 100%  Imaging: CT A/P showing heterogenous, enlarged prostate along with thick-walled appearance of bladder. Distention of the rectum with stool with associated rectal wall   thickening and inflammatory changes, consistent with stercoral colitis. Heterogeneous appearance of the liver and right hepatic lobe hemangioma. Diffuse osseous metastasis.  Medications Given: N/A

## 2023-09-18 NOTE — PATIENT PROFILE ADULT - FALL HARM RISK - HARM RISK INTERVENTIONS

## 2023-09-18 NOTE — ED PROVIDER NOTE - NSICDXPASTMEDICALHX_GEN_ALL_CORE_FT
PAST MEDICAL HISTORY:  Atherosclerosis of coronary artery CAD (coronary artery disease)    Complete atrioventricular block Complete heart block    Essential hypertension HTN (hypertension)    Hyperlipidemia HLD (hyperlipidemia)

## 2023-09-18 NOTE — H&P ADULT - NSHPREVIEWOFSYSTEMS_GEN_ALL_CORE
CONSTITUTIONAL: + weakness, no fevers or chills  EYES/ENT: No visual changes;  No vertigo or throat pain   NECK: No pain or stiffness  RESPIRATORY: + cough. No wheezing, hemoptysis; No shortness of breath  CARDIOVASCULAR: No chest pain or palpitations  GASTROINTESTINAL: No abdominal or epigastric pain. No nausea, vomiting, or hematemesis; No diarrhea or constipation. No melena or hematochezia.  GENITOURINARY: + frequency. No dysuria, hematuria  NEUROLOGICAL: No numbness or weakness  SKIN: No itching, burning, rashes, or lesions   PSYCH: no hx depression, no hx anxiety

## 2023-09-18 NOTE — ED PROVIDER NOTE - CARDIAC, MLM
Normal rate, regular rhythm.  Heart sounds S1, S2.  No murmurs, rubs or gallops. Purple DH (Discharge Huddle; Vulnerable Patient)

## 2023-09-18 NOTE — H&P ADULT - PROBLEM SELECTOR PLAN 6
Diet: Clear liquid, advance as tolerated  DVT ppx: Holding eliquis for LGIB; SCDs for now.  Ethics: Full code; GOC discussions ongoing with patient and spouse

## 2023-09-18 NOTE — H&P ADULT - ATTENDING COMMENTS
88M w/ HTN, CAD s/p stent (2012), AF (apixaban) s/p PPM, hemoirrhoids presenting with BRBPR x 1 day as well as ongoing fatigue, nocturia, and 30 lb unintentional weight loss over the past several months with initial studies showing Hgb close to recent baseline (10.6) and CT chest/abdomen/pelvis with stercoral colitis, heterogenous liver, enlarged, heterogeneous prostate and diffuse osseous metastases concerning for metastatic prostate cancer.     Discussed findings with patient and his wife at bedside. They would like to think more about whether pursuing further diagnostics would be within goals of care.    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

## 2023-09-18 NOTE — ED ADULT TRIAGE NOTE - CHIEF COMPLAINT QUOTE
Pt BIBEMS c/o bloody stool x3 days, states bleeding has been getting heavier. Currently on Eliquis. Denies any chest pain, dizziness, fatigue, sob, recent falls or trauma. PHx MI, Pacemaker.

## 2023-09-18 NOTE — ED ADULT NURSE NOTE - CAS EDN DISCHARGE ASSESSMENT
evere sepsis  choledocholithiasis  cholangitis  bacteremia  RA on arava  accel htn    advance po  continue iv abx today  change to po abx in am  fu bp today  anticipate dc in am on po abx as per gi Alert and oriented to person, place and time

## 2023-09-18 NOTE — H&P ADULT - PROBLEM SELECTOR PLAN 2
Osseous mets found on CT A/P with enlarged prostate as suspected source. Notes 20-30 lb weight loss over past 2 months.  - F/u PSA  - GOC discussion initiated with family. Patient and spouse will decide together on decision to undergo further workup/treatment.

## 2023-09-18 NOTE — ED PROVIDER NOTE - OBJECTIVE STATEMENT
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 Reports noted dark red blood in stool during bowel movements yesterday and today. As per wife, patient had one episode of bleeding on bedsheet 2 months ago after taking one pill of Advil. Admits rectal pain. Admits urinary frequency. Denies any fever, chills, n/v/d, abdominal pain. 89 yo M  with HTN, CAD S/P PCI stent in 2012, atrial fibrillation on Eliquis, and high degree / complete AV block s/p PPM MDT implant 8/17/15, hemorrhoid presents to ED c/o bloody stools since yesterday. Reports noted dark red blood in stool during bowel movements yesterday and today. As per wife, patient had one episode of bleeding on bedsheet 2 months ago after taking one pill of Advil. Admits rectal pain. Admits urinary frequency. Admits cough since July. Admits weight loss. Denies any fever, chills, n/v/d, abdominal pain, sob, palpitation, or any other complaints.

## 2023-09-18 NOTE — CONSULT NOTE ADULT - SUBJECTIVE AND OBJECTIVE BOX
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 He was seen by his outpatient cardiologist at the beginning of the month and was scheduled for CT C/A/P and labs to eval for occult malignancy    GI consulted for further evaluation of brbpr. CTAP showing large stool burden throughout colon and rectum distended with stool measuring up to 8.4 cm, mild rectal wall thickening and surrounding stranding, no bowel obstruction large stool. pt seen and examined.      currently avss  normocytic anemia, hgb 10.6--> 10.2 (hgb ~11 in 2023)  alp 457  ua w/ large blood  cta ap neg for active gib, as above, heterogeneous appearance of liver w/ suggestion of hypervascular foci- nonspecific, and showing diffuse osseous mets  2022 echo  Summary:  Left ventricular ejection fraction, by visual estimation, is 30 to 35%.  Moderately decreased segmental left ventricular systolic function.  Moderate mitral valve regurgitation.  Moderate tricuspid regurgitation.          Allergies:  No Known Allergies      PMHX/PSHX:  Complete atrioventricular block    Atherosclerosis of coronary artery    Hyperlipidemia    Essential hypertension    History of total knee replacement        Family history:  Family history of ischemic heart disease    Social History: as above    Home Medications: h&p reviewed    Hospital Medications:  polyethylene glycol 3350 17 Gram(s) Oral two times a day  senna 2 Tablet(s) Oral daily        ROS:   General:  AS PER HPI  Eyes:  Adequate vision, no reported pain  ENT:  No sore throat, runny nose, dysphagia  CV:  No chest pain, palpitations  Pulm:  No dyspnea, +cough, no tachypnea, wheezing  GI:  AS PER HPI  :  No pain, bleeding, burning  Muscle:  No pain  Neuro:  No tingling, numbness, memory problems  Psych:  No insomnia, mood problems, depression  Endocrine:  No polyuria, cold/heat intolerance  Heme:  No petechiae, ecchymosis, easy bruisability  Skin:  No rash, edema      Vital Signs:  Vital Signs Last 24 Hrs  T(C): 36.4 (18 Sep 2023 09:57), Max: 37.1 (18 Sep 2023 07:08)  T(F): 97.6 (18 Sep 2023 09:57), Max: 98.8 (18 Sep 2023 07:08)  HR: 60 (18 Sep 2023 09:57) (60 - 86)  BP: 111/61 (18 Sep 2023 09:57) (109/55 - 129/65)  BP(mean): --  RR: 20 (18 Sep 2023 09:57) (16 - 20)  SpO2: 100% (18 Sep 2023 09:57) (96% - 100%)    Parameters below as of 18 Sep 2023 09:57  Patient On (Oxygen Delivery Method): room air      Daily     Daily     LABS:                        10.2   11.26 )-----------( 238      ( 18 Sep 2023 10:40 )             31.2     Mean Cell Volume: 86.7 fL (23 @ 10:40)        139  |  99  |  20  ----------------------------<  119<H>  4.5   |  27  |  0.84    Ca    8.8      18 Sep 2023 03:21    TPro  5.8<L>  /  Alb  3.3  /  TBili  0.6  /  DBili  x   /  AST  13  /  ALT  9   /  AlkPhos  457<H>      LIVER FUNCTIONS - ( 18 Sep 2023 03:21 )  Alb: 3.3 g/dL / Pro: 5.8 g/dL / ALK PHOS: 457 U/L / ALT: 9 U/L / AST: 13 U/L / GGT: x           PT/INR - ( 18 Sep 2023 03:21 )   PT: 15.3 sec;   INR: 1.38 ratio         PTT - ( 18 Sep 2023 03:21 )  PTT:31.8 sec  Urinalysis Basic - ( 18 Sep 2023 07:07 )    Color: Yellow / Appearance: Clear / S.081 / pH: x  Gluc: x / Ketone: Negative mg/dL  / Bili: Negative / Urobili: 1.0 mg/dL   Blood: x / Protein: 30 mg/dL / Nitrite: Negative   Leuk Esterase: Negative / RBC: 2 /HPF / WBC 0 /HPF   Sq Epi: x / Non Sq Epi: 3 /HPF / Bacteria: Occasional /HPF                              10.2   11.26 )-----------( 238      ( 18 Sep 2023 10:40 )             31.2                         10.6   13.30 )-----------( 252      ( 18 Sep 2023 03:21 )             33.2       PHYSICAL EXAM:   GENERAL:  Lying in bed in NAD  HEENT:  Normocephalic/atraumatic, no scleral icterus  NECK: Trachea midline  CHEST:  Resp even, non labored   ABDOMEN:  Soft, non-tender, non-distended  EXTREMITIES: WWP, no edema  SKIN:  No rash or jaundice  NEURO:  Alert and oriented x 3, no asterixis    Imaging:    ACC: [id number] EXAM:  CT CHEST IC   ORDERED BY: CORINE GE     ACC: [id number] EXAM:  CT ANGIO ABD PELV (W)AW IC   ORDERED BY: CORINE GE     PROCEDURE DATE:  2023          INTERPRETATION:  CLINICAL INFORMATION: Rectal bleeding. Severe decline in   ADLs. Evaluate for active extravasation.    COMPARISON: None.    CONTRAST/COMPLICATIONS:  IV Contrast: Omnipaque 350 (accession [id number]), IV contrast documented   in unlinked concurrent exam (accession [id number])  90 cc administered   10   cc discarded  Oral Contrast: NONE  Complications: None reported at time of study completion    PROCEDURE:  CT Angiography of the abdomen and pelvis was performed followed by   delayed phase imaging of the chest, abdomen and pelvis.  Sagittal and coronal reformats were performed as well as 3D (MIP)   reconstructions.    FINDINGS:  CHEST:  LUNGS AND LARGE AIRWAYS: Patent central airways. Bilateral lower lobe   subsegmental atelectasis. Branching nodule of the right lower lobe is   likely impacted airway. There is bronchial wall thickening noted   bilaterally, compatible with bronchiolitis.  PLEURA: Trace right pleural effusion.  VESSELS: Atherosclerotic calcifications of aorta and coronary arteries.  HEART: Cardiomegaly. Intracardiac leads in the right atrium and   ventricle. No pericardial effusion.  MEDIASTINUM AND KATALINA: No lymphadenopathy.  CHEST WALL AND LOWER NECK: Enlarged, heterogeneous right thyroid lobe   with punctate calcifications. Left chest wall pacemaker device.    ABDOMEN AND PELVIS:  LIVER: Heterogeneous appearance on arterial phase imaging, with   suggestion of hypervascular foci. This may be related to perfusion   phenomenon, though nonspecific. Additional 1.2 cm hypodense nodule with   peripheral arterial enhancement and subsequent filling is compatible with   hemangioma..  BILE DUCTS: Normal caliber.  GALLBLADDER: Cholelithiasis.  SPLEEN: Within normal limits.  PANCREAS: Within normal limits.  ADRENALS: 2.0 cm left adrenal adenoma.  KIDNEYS/URETERS: 6.0 cm left renal cyst. Small right renal cyst. No   hydronephrosis.    BLADDER: Minimally distended but appears diffusely thick-walled  REPRODUCTIVE ORGANS: Heterogeneous, enlarged prostate.    BOWEL: The rectum is distended with stool measuring up to 8.4 cm in   greatest diameter. There is mild rectal wall thickening and surrounding   stranding. No bowel obstruction. Appendix is not visualized. No evidence   of inflammation in the pericecal region. A large stool burden is seen   throughout the colon, compatible constipation.  PERITONEUM: No large volume ascites. Mild stranding seen within the   pelvis. No free air.  VESSELS: Atherosclerotic changes.  RETROPERITONEUM/LYMPH NODES: Mild stranding seen along the   retroperitoneum. No lymphadenopathy.  ABDOMINAL WALL: Subcutaneous lipomas overlying the right anterolateral   abdominal wall, right gluteal muscles, and left lateral abdominal wall.  BONES: Degenerative changes. Diffuse sclerotic bone lesions involving the   spine, ribs, pelvic bones, and right femur, compatible with metastatic   disease.    IMPRESSION:  No CT evidence of active GI bleeding at the time scanning.    Constipation..    Distention of the rectum with stool with associated rectal wall   thickening and inflammatory changes, consistent with stercoral colitis.    Heterogeneous, enlarged prostate. Correlate with PSA. MRI prostate may be   performed for further evaluation.    Multiple subcutaneous lipomas.    Thick-walled appearance of the urinary bladder, likely related to its   underdistended nature or sequelae of chronic outlet obstruction from   prostate enlargement. Cystitis would appear similar. Correlate with   urinalysis.    Markedly enlarged prostate gland. Correlate for underlying prostatic   pathology.    Heterogeneous appearance of the liver. This is of uncertain etiology.   There is a right hepatic lobe hemangioma. MRI may be helpful for further   delineation.    Question bronchiolitis of the bilateral lower lobes.    Diffuse osseous metastasis.    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 He was seen by his outpatient cardiologist at the beginning of the month and was scheduled for CT C/A/P and labs to eval for occult malignancy    GI consulted for further evaluation of brbpr. CTAP showing large stool burden throughout colon and rectum distended with stool measuring up to 8.4 cm, mild rectal wall thickening and surrounding stranding, no bowel obstruction. pt seen and examined. poor historian- wife at bedside assisted w/ hx. reports low volume intermittent brb w/ stool for the past ~2 wks, however yesterday increased in volume- reports 2 episodes of large volume hematochezia with bms at home so came to ED, denies any straining w/ defecation. stool now w/ less blood per wife. pt w/ multiple complaints. recently w/ constipation iso tramadol use (from dentist), was not on lax at home, now c/o loose stool and incontinence which is preventing him from sleeping, along w/ urinary incontinence and mild suprapubic pain. also c/o cough, upper throat pain and oropharyngeal type dysphagia symptoms. significant weight loss of ~20# over unspecified timeline but per wife ever since recent dental surgery few months ago pt has had decreased po intake and overall decline. no recent travel or sick contacts. denies f/c,  n/v, hematemesis, acute abd pain, melena, or hx of liver dz. hx of remote colon- findings of hemorrhoids. has never had egd. no prior abd sx. meds- states only on eliquis, no significant nsaid use and denies antiplt use. fhx nc. former smoker, no etoh use      currently avss  normocytic anemia, hgb 10.6--> 10.2 (hgb ~11 in 2023)  alp 457  ua w/ large blood  cta ap neg for active gib, as above, heterogeneous appearance of liver w/ suggestion of hypervascular foci- nonspecific, and showing diffuse osseous mets  2022 echo  Summary:  Left ventricular ejection fraction, by visual estimation, is 30 to 35%.  Moderately decreased segmental left ventricular systolic function.  Moderate mitral valve regurgitation.  Moderate tricuspid regurgitation.          Allergies:  No Known Allergies      PMHX/PSHX:  Complete atrioventricular block    Atherosclerosis of coronary artery    Hyperlipidemia    Essential hypertension    History of total knee replacement        Family history:  Family history of ischemic heart disease    Social History: as above    Home Medications: h&p reviewed    Hospital Medications:  polyethylene glycol 3350 17 Gram(s) Oral two times a day  senna 2 Tablet(s) Oral daily        ROS:   General:  AS PER HPI  Eyes:  Adequate vision, no reported pain  ENT:  No sore throat, runny nose, ?dysphagia  CV:  No chest pain, palpitations  Pulm:  +dyspnea, +cough, no tachypnea, wheezing  GI:  AS PER HPI  :  No pain, bleeding, burning  Muscle:  No pain  Neuro:  No tingling, numbness  Psych:  No insomnia, mood problems, depression  Endocrine:  No polyuria, cold/heat intolerance  Heme:  No petechiae, ecchymosis, easy bruisability  Skin:  No rash, edema      Vital Signs:  Vital Signs Last 24 Hrs  T(C): 36.4 (18 Sep 2023 09:57), Max: 37.1 (18 Sep 2023 07:08)  T(F): 97.6 (18 Sep 2023 09:57), Max: 98.8 (18 Sep 2023 07:08)  HR: 60 (18 Sep 2023 09:57) (60 - 86)  BP: 111/61 (18 Sep 2023 09:57) (109/55 - 129/65)  BP(mean): --  RR: 20 (18 Sep 2023 09:57) (16 - 20)  SpO2: 100% (18 Sep 2023 09:57) (96% - 100%)    Parameters below as of 18 Sep 2023 09:57  Patient On (Oxygen Delivery Method): room air      Daily     Daily     LABS:                        10.2   11.26 )-----------( 238      ( 18 Sep 2023 10:40 )             31.2     Mean Cell Volume: 86.7 fL (- @ 10:40)        139  |  99  |  20  ----------------------------<  119<H>  4.5   |  27  |  0.84    Ca    8.8      18 Sep 2023 03:21    TPro  5.8<L>  /  Alb  3.3  /  TBili  0.6  /  DBili  x   /  AST  13  /  ALT  9   /  AlkPhos  457<H>  09-18    LIVER FUNCTIONS - ( 18 Sep 2023 03:21 )  Alb: 3.3 g/dL / Pro: 5.8 g/dL / ALK PHOS: 457 U/L / ALT: 9 U/L / AST: 13 U/L / GGT: x           PT/INR - ( 18 Sep 2023 03:21 )   PT: 15.3 sec;   INR: 1.38 ratio         PTT - ( 18 Sep 2023 03:21 )  PTT:31.8 sec  Urinalysis Basic - ( 18 Sep 2023 07:07 )    Color: Yellow / Appearance: Clear / S.081 / pH: x  Gluc: x / Ketone: Negative mg/dL  / Bili: Negative / Urobili: 1.0 mg/dL   Blood: x / Protein: 30 mg/dL / Nitrite: Negative   Leuk Esterase: Negative / RBC: 2 /HPF / WBC 0 /HPF   Sq Epi: x / Non Sq Epi: 3 /HPF / Bacteria: Occasional /HPF                              10.2   11.26 )-----------( 238      ( 18 Sep 2023 10:40 )             31.2                         10.6   13.30 )-----------( 252      ( 18 Sep 2023 03:21 )             33.2       PHYSICAL EXAM:   GENERAL: lying in bed, frail, chronically ill appearing, in no apparent distress  HEENT: NCAT, no thrush appreciated  EYES: anicteric sclerae, moist conjunctivae  NECK: trachea midline, FROM  CHEST:  respirations even, non labored  ABDOMEN:  soft, non-tender, non-distended, no RT/no guarding; DERREK brown loose stool w/ blood tinge, hard stool felt more proximally   EXTREMITIES: WWP, trace edema  SKIN:  warm/dry, no visible rash appreciated  PSYCH: awake alert responsive, forgetful at times   NEURO: no tremor noted     Imaging:    ACC: [id number] EXAM:  CT CHEST IC   ORDERED BY: CORINE GE     ACC: [id number] EXAM:  CT ANGIO ABD PELV (W)AW IC   ORDERED BY: CORINE GE     PROCEDURE DATE:  2023          INTERPRETATION:  CLINICAL INFORMATION: Rectal bleeding. Severe decline in   ADLs. Evaluate for active extravasation.    COMPARISON: None.    CONTRAST/COMPLICATIONS:  IV Contrast: Omnipaque 350 (accession [id number]), IV contrast documented   in unlinked concurrent exam (accession [id number])  90 cc administered   10   cc discarded  Oral Contrast: NONE  Complications: None reported at time of study completion    PROCEDURE:  CT Angiography of the abdomen and pelvis was performed followed by   delayed phase imaging of the chest, abdomen and pelvis.  Sagittal and coronal reformats were performed as well as 3D (MIP)   reconstructions.    FINDINGS:  CHEST:  LUNGS AND LARGE AIRWAYS: Patent central airways. Bilateral lower lobe   subsegmental atelectasis. Branching nodule of the right lower lobe is   likely impacted airway. There is bronchial wall thickening noted   bilaterally, compatible with bronchiolitis.  PLEURA: Trace right pleural effusion.  VESSELS: Atherosclerotic calcifications of aorta and coronary arteries.  HEART: Cardiomegaly. Intracardiac leads in the right atrium and   ventricle. No pericardial effusion.  MEDIASTINUM AND KATALINA: No lymphadenopathy.  CHEST WALL AND LOWER NECK: Enlarged, heterogeneous right thyroid lobe   with punctate calcifications. Left chest wall pacemaker device.    ABDOMEN AND PELVIS:  LIVER: Heterogeneous appearance on arterial phase imaging, with   suggestion of hypervascular foci. This may be related to perfusion   phenomenon, though nonspecific. Additional 1.2 cm hypodense nodule with   peripheral arterial enhancement and subsequent filling is compatible with   hemangioma..  BILE DUCTS: Normal caliber.  GALLBLADDER: Cholelithiasis.  SPLEEN: Within normal limits.  PANCREAS: Within normal limits.  ADRENALS: 2.0 cm left adrenal adenoma.  KIDNEYS/URETERS: 6.0 cm left renal cyst. Small right renal cyst. No   hydronephrosis.    BLADDER: Minimally distended but appears diffusely thick-walled  REPRODUCTIVE ORGANS: Heterogeneous, enlarged prostate.    BOWEL: The rectum is distended with stool measuring up to 8.4 cm in   greatest diameter. There is mild rectal wall thickening and surrounding   stranding. No bowel obstruction. Appendix is not visualized. No evidence   of inflammation in the pericecal region. A large stool burden is seen   throughout the colon, compatible constipation.  PERITONEUM: No large volume ascites. Mild stranding seen within the   pelvis. No free air.  VESSELS: Atherosclerotic changes.  RETROPERITONEUM/LYMPH NODES: Mild stranding seen along the   retroperitoneum. No lymphadenopathy.  ABDOMINAL WALL: Subcutaneous lipomas overlying the right anterolateral   abdominal wall, right gluteal muscles, and left lateral abdominal wall.  BONES: Degenerative changes. Diffuse sclerotic bone lesions involving the   spine, ribs, pelvic bones, and right femur, compatible with metastatic   disease.    IMPRESSION:  No CT evidence of active GI bleeding at the time scanning.    Constipation..    Distention of the rectum with stool with associated rectal wall   thickening and inflammatory changes, consistent with stercoral colitis.    Heterogeneous, enlarged prostate. Correlate with PSA. MRI prostate may be   performed for further evaluation.    Multiple subcutaneous lipomas.    Thick-walled appearance of the urinary bladder, likely related to its   underdistended nature or sequelae of chronic outlet obstruction from   prostate enlargement. Cystitis would appear similar. Correlate with   urinalysis.    Markedly enlarged prostate gland. Correlate for underlying prostatic   pathology.    Heterogeneous appearance of the liver. This is of uncertain etiology.   There is a right hepatic lobe hemangioma. MRI may be helpful for further   delineation.    Question bronchiolitis of the bilateral lower lobes.    Diffuse osseous metastasis.    --- End of Report ---

## 2023-09-18 NOTE — CONSULT NOTE ADULT - ASSESSMENT
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 He was seen by his outpatient cardiologist at the beginning of the month and was scheduled for CT C/A/P and labs to eval for occult malignancy given significant wt loss. GI consulted for further evaluation of hematochezia. HDS, labs showing normocytic anemia, hgb 10.6-->10.2, around prior baseline of ~11 in 2/2023, alp 457, UA w/ large blood, CTA AP neg for active gib but showing large stool burden throughout colon, rectum distended with stool measuring up to 8.4 cm with mild rectal wall thickening and surrounding stranding, no bowel obstruction, as well as diffuse osseous mets.    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 He was seen by his outpatient cardiologist at the beginning of the month and was scheduled for CT C/A/P and labs to eval for occult malignancy given significant wt loss. GI consulted for further evaluation of hematochezia. HDS, labs showing normocytic anemia, hgb 10.6-->10.2, around prior baseline of ~11 in 2/2023, alp 457, UA w/ large blood, CTA AP neg for active gib but showing large stool burden throughout colon, rectum distended with stool measuring up to 8.4 cm with mild rectal wall thickening and surrounding stranding, no bowel obstruction, as well as diffuse osseous mets.    # acute on chronic hematochezia w/ large stool burden/stercoral colitis seen on imaging- w/ relatively stable HH around usual baseline, has been on AC, upon eval stools now brown w/ blood tinge, suspect likely outlet bleeding iso stercoral colitis/constipation however cannot definitively r/o potential malignancy   # significant weight loss  # elevated ALP - CT showing heterogeneous appearance of liver w/ suggestion of hypervascular foci which is non specific  # diffuse osseous mets on imaging - suspected prostatic source per medicine  # CAD sp PCI w/ stent 2012 - not on antiplts  # afib - eliquis currently on hold  # high degree/complete AV block s/p PPM - interrogated in office 9/5/2023  # CM w/ EF 30-35%/mod MR/mod TR    Recommendations-    87 yo M  with HTN, CAD S/P PCI stent in 2012- not currently on antiplts, atrial fibrillation on Eliquis, 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 He was seen by his outpatient cardiologist at the beginning of the month and was scheduled for CT C/A/P and labs to eval for occult malignancy given significant wt loss. GI consulted for further evaluation of hematochezia. HDS, labs showing normocytic anemia, hgb 10.6-->10.2, around prior baseline of ~11 in 2/2023, alp 457, UA w/ large blood, CTA AP neg for active gib but showing large stool burden throughout colon, rectum distended with stool measuring up to 8.4 cm with mild rectal wall thickening and surrounding stranding, no bowel obstruction, as well as diffuse osseous mets.    # acute on chronic hematochezia w/ large stool burden/stercoral colitis seen on imaging- w/ reports of prior intermittent brb in stool now recently worsened, associated w/ prior constipation though stools now loose (possible overflow component), +AC use, last colon done remotely, p/w relatively stable HH around usual baseline, upon eval stools now brown w/ blood tinge, suspect likely outlet bleeding iso stercoral colitis/constipation however cannot definitively r/o potential malignancy   # elevated ALP - CT showing heterogeneous appearance of liver w/ suggestion of hypervascular foci which is non specific  # diffuse osseous mets on imaging - suspected prostatic source per medicine  # significant weight loss  # CAD sp PCI w/ stent 2012 - not on antiplts  # afib - eliquis currently on hold  # s/p PPM - interrogated in office 9/5/2023  # CM w/ EF 30-35%/mod MR/mod TR    Recommendations-   - aggressive bowel regimen- rec smog enema x1 now and would start 2L golytely for pt to sip on given large stool burden   - maintain active T&S, and adequate IV access  - trend cbc, transfuse for hgb >/=8  - check GGT, iron studies  - can obtain MRI to further evaluate liver findings  - no gi objection to diet, consider SLP input given oropharyngeal complaints  - no immediate plans for colonoscopy given stable HH and large stool burden, however after aggressive bowel regimen, pending clinical course, and when medically optimized, can consider potential colonoscopy if remains within current GOC (or sooner if overt gib recurs), given advanced age/comorbidities, pt would be higher anesthesia risk and would require cardiac risk stratification prior to any procedures  - please notify GI team of any acute clinical status changes   - rest of care as per primary team     dw gi attg    DERECK Hartley PA-C, RD, CDN  available on TEAMS for questions  after 5pm/weekends, please contact the on-call GI fellow at [phone]  87 yo M  with HTN, CAD S/P PCI stent in 2012- not currently on antiplts, atrial fibrillation on Eliquis, 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 He was seen by his outpatient cardiologist at the beginning of the month and was scheduled for CT C/A/P and labs to eval for occult malignancy given significant wt loss. GI consulted for further evaluation of hematochezia. HDS, labs showing normocytic anemia, hgb 10.6-->10.2, around prior baseline of ~11 in 2/2023, alp 457, UA w/ large blood, CTA AP neg for active gib but showing large stool burden throughout colon, rectum distended with stool measuring up to 8.4 cm with mild rectal wall thickening and surrounding stranding, no bowel obstruction, as well as diffuse osseous mets.    # acute on chronic hematochezia w/ large stool burden/stercoral colitis seen on imaging- w/ reports of prior intermittent brb in stool now recently worsened, associated w/ prior constipation though stools now loose (possible overflow component), +AC use, last colon done remotely, p/w relatively stable HH around usual baseline, upon eval stools now brown w/ blood tinge, suspect likely outlet bleeding iso stercoral colitis/constipation however cannot definitively r/o potential malignancy   # elevated ALP - CT showing heterogeneous appearance of liver w/ suggestion of hypervascular foci which is non specific  # diffuse osseous mets on imaging - suspected prostatic source per medicine  # significant weight loss  # CAD sp PCI w/ stent 2012 - not on antiplts  # afib - eliquis currently on hold  # s/p PPM - interrogated in office 9/5/2023  # CM w/ EF 30-35%/mod MR/mod TR    Recommendations-   - aggressive bowel regimen- rec smog enema x1 now and would start 2L golytely for pt to sip on given large stool burden   - maintain active T&S, and adequate IV access  - trend cbc, transfuse for hgb >/=8  - check GGT, iron studies  - can obtain MRI to further evaluate liver findings  - no gi objection to diet as tolerated, consider SLP input given oropharyngeal complaints  - no immediate plans for colonoscopy given stable HH and large stool burden, however after aggressive bowel regimen, pending clinical course, and when medically optimized, can consider potential colonoscopy if remains within current GOC (or sooner if overt gib recurs), given advanced age/comorbidities, pt would be higher anesthesia risk and would require cardiac risk stratification prior to any procedures  - please notify GI team of any acute clinical status changes   - rest of care as per primary team     dw gi attg    DERECK Hartley PA-C, RD, CDN  available on TEAMS for questions  after 5pm/weekends, please contact the on-call GI fellow at [phone]

## 2023-09-18 NOTE — ED PROVIDER NOTE - ATTENDING APP SHARED VISIT CONTRIBUTION OF CARE
89 y/o M with h/o HTN, hyperlipidemia, CAD s/p stent (2012), AF on Eliquis, CHB s/p PPM p/w bloody stools.  Pt reports 10 days of blood in stool.  He reports red blood mixed with black stool.  Wife also reports seeing purely blood in his diaper.  Of note, pt with persistent cough since a dental procedure in July 2023.  Over the past few weeks, he has been experiencing a severe decline in ADLs (not walking around as much, new incontinence requiring the use of a diaper, sacral pain, and weight loss).  He was seen by his outpatient cardiologist at the beginning of the month and is scheduled for CT C/A/P and labs to eval for occult malignancy.  No fever, cp, sob, loc/syncope.  Last colonoscopy/EGD >10 years ago, reported to have ?polyps    Chronically ill appearing, cachectic, lying in stretcher, awake and alert, nontoxic.  AF/VSS.  Lungs cta bl.  Cards nl S1/S2, RRR, no MRG.  Abd soft ntnd, rectal exam with reddish stool, no romeo blood, no visible or palpable hemorrhoids/masses/lesions.  No pedal edema or calf tenderness.  No focal neuro deficits.    Pt with likely GIB - given reports of romeo blood per rectum, will obtain CTA a/p to evaluate for active extravasation.  Pt is otherwise HD stable, no s/s to suggest anemia but will obtain labs and transfuse if necessary.  Given report of 3 months of cough in setting of weight loss, concern for malignancy, will extend CT of the abd/pel to chest to eval for resp etiology vs underlying malignancy.  Plan for labs, ekg, ct/cta, ppi, transfuse prn, admit. 89 y/o M with h/o HTN, hyperlipidemia, CAD s/p stent (2012), AF on Eliquis, CHB s/p PPM p/w bloody stools.  Pt reports 10 days of blood in stool.  He reports red blood mixed with black stool.  Wife also reports seeing purely blood in his diaper.  Of note, pt with persistent cough since a dental procedure in July 2023.  Over the past few weeks, he has been experiencing a severe decline in ADLs (not walking around as much, pain to back/sacrum/legs requiring the use of a diaper, sacral pain, and weight loss).  He was seen by his outpatient cardiologist at the beginning of the month and is scheduled for CT C/A/P and labs to eval for occult malignancy.  No fever, cp, sob, loc/syncope.  Last colonoscopy/EGD >10 years ago, reported to have ?polyps    Chronically ill appearing, cachectic, lying in stretcher, awake and alert, nontoxic.  AF/VSS.  Lungs cta bl.  Cards nl S1/S2, RRR, no MRG.  Abd soft ntnd, rectal exam with reddish stool, no romeo blood, no visible or palpable hemorrhoids/masses/lesions.  No pedal edema or calf tenderness.  No focal neuro deficits.    Pt with likely GIB - given reports of romeo blood per rectum, will obtain CTA a/p to evaluate for active extravasation.  Pt is otherwise HD stable, no s/s to suggest anemia but will obtain labs and transfuse if necessary.  Given report of 3 months of cough in setting of weight loss, concern for malignancy, will extend CT of the abd/pel to chest to eval for resp etiology vs underlying malignancy.  Plan for labs, ekg, ct/cta, ppi, transfuse prn, admit.

## 2023-09-18 NOTE — ED ADULT NURSE NOTE - NSFALLHARMRISKINTERV_ED_ALL_ED
Assistance OOB with selected safe patient handling equipment if applicable/Assistance with ambulation/Communicate risk of Fall with Harm to all staff, patient, and family/Monitor gait and stability/Provide visual cue: red socks, yellow wristband, yellow gown, etc/Reinforce activity limits and safety measures with patient and family/Bed in lowest position, wheels locked, appropriate side rails in place/Call bell, personal items and telephone in reach/Instruct patient to call for assistance before getting out of bed/chair/stretcher/Non-slip footwear applied when patient is off stretcher/[place] to call system/Physically safe environment - no spills, clutter or unnecessary equipment/Purposeful Proactive Rounding/Room/bathroom lighting operational, light cord in reach

## 2023-09-18 NOTE — ED PROVIDER NOTE - NSICDXFAMILYHX_GEN_ALL_CORE_FT
FAMILY HISTORY:  Family history of ischemic heart disease, Family history of myocardial infarction

## 2023-09-18 NOTE — H&P ADULT - NSHPLABSRESULTS_GEN_ALL_CORE
LABS:                        10.2   11.26 )-----------( 238      ( 18 Sep 2023 10:40 )             31.2     09-18    138  |  100  |  18  ----------------------------<  95  4.5   |  27  |  0.82    Ca    8.4      18 Sep 2023 10:40  Phos  3.6     09-18  Mg     2.10     09-18    TPro  5.2<L>  /  Alb  2.8<L>  /  TBili  0.6  /  DBili  x   /  AST  12  /  ALT  8   /  AlkPhos  416<H>  09-18    PT/INR - ( 18 Sep 2023 03:21 )   PT: 15.3 sec;   INR: 1.38 ratio      PTT - ( 18 Sep 2023 03:21 )  PTT:31.8 sec    Urinalysis Basic - ( 18 Sep 2023 10:40 )    Color: x / Appearance: x / SG: x / pH: x  Gluc: 95 mg/dL / Ketone: x  / Bili: x / Urobili: x   Blood: x / Protein: x / Nitrite: x   Leuk Esterase: x / RBC: x / WBC x   Sq Epi: x / Non Sq Epi: x / Bacteria: x    Microbiology     EKG:    RADIOLOGY & ADDITIONAL TESTS:

## 2023-09-18 NOTE — H&P ADULT - NSHPPHYSICALEXAM_GEN_ALL_CORE
LOS:     VITALS:   T(C): 36.4 (09-18-23 @ 09:57), Max: 37.1 (09-18-23 @ 07:08)  HR: 60 (09-18-23 @ 09:57) (60 - 86)  BP: 111/61 (09-18-23 @ 09:57) (109/55 - 129/65)  RR: 20 (09-18-23 @ 09:57) (16 - 20)  SpO2: 100% (09-18-23 @ 09:57) (96% - 100%)    GENERAL: NAD, lying in bed comfortably  HEAD:  Atraumatic, Normocephalic  EYES: EOMI, PERRLA, conjunctiva and sclera clear  ENT: Moist mucous membranes  NECK: Supple, No JVD  CHEST/LUNG: Clear to auscultation bilaterally; No rales, rhonchi, wheezing, or rubs. Unlabored respirations  HEART: Regular rate and rhythm; No murmurs, rubs, or gallops  ABDOMEN: BSx4; Soft, nontender, nondistended  EXTREMITIES:  2+ Peripheral Pulses, brisk capillary refill. No clubbing, cyanosis, or edema  NERVOUS SYSTEM:  A&Ox3, no focal deficits   SKIN: No rashes or lesions

## 2023-09-18 NOTE — H&P ADULT - ASSESSMENT
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 CT on admission revealed diffuse osseous mets, with an enlarged prostate as the potential source. Pending C discussion with patient and spouse to decide treatment vs comfort care.

## 2023-09-18 NOTE — ED PROVIDER NOTE - PROGRESS NOTE DETAILS
Lamont DORSEY: Pt and family updated on results.  Hb 10, no episodes of bleeding here.  HD stable.  Pt to be admitted to medicine for further management of GIB and malignancy work up.

## 2023-09-18 NOTE — ED PROVIDER NOTE - CLINICAL SUMMARY MEDICAL DECISION MAKING FREE TEXT BOX
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 Susupicon for lower GI bleed. Ddx: hemorrhoid, anal fissure. Plan: labs, Ua, occult fece, and reassess, disposition per lab results.

## 2023-09-19 NOTE — DIETITIAN INITIAL EVALUATION ADULT - OTHER INFO
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 CT revealed large stool burden, diffuse osseous mets, enlarged prostate as the potential source. Pending GOC discussion. GI was consulted, no immediate plans for colonoscopy.     Pt's family present at bedside throughout interaction. Confirms NKFA. Endorses difficulties chewing secondary to sinus discomfort, congested cough. Denies difficulties swallowing. Pt lives at home with family, consumes a Kosher diet and supplements with vanilla Ensure.     Pt with poor appetite/PO intake in house, upset about frequent BMs (previously ordered for Golytely). Currently ordered for Miralax 17 gm BID and senna 2 tablets qHS. S/p bedside swallow assessment (9/19) which recommended soft and bite sized solids/thin liquids.

## 2023-09-19 NOTE — SWALLOW BEDSIDE ASSESSMENT ADULT - SWALLOW EVAL: DIAGNOSIS
Patient presents with grossly intact oral and pharyngeal stages of swallow to safely continue a soft & bite-sized diet and thin liquids. Functional oral prep and transport appreciated, as well as timely pharyngeal triggering (suspected) and palpable hyolaryngeal elevation/excursion. Intermittent cough noted throughout PO trials yet judged to be clinically insignificant given baseline presentation. Pt declined hard solid textures 2/2 upper R sided dental discomfort extending superiorly into region of R maxillary sinus.

## 2023-09-19 NOTE — PROGRESS NOTE ADULT - ASSESSMENT
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 CT on admission revealed diffuse osseous mets, with an enlarged prostate as the potential source. Pending C discussion with patient and spouse to decide treatment vs comfort care.

## 2023-09-19 NOTE — DIETITIAN NUTRITION RISK NOTIFICATION - ADDITIONAL COMMENTS/DIETITIAN RECOMMENDATIONS
Please see Dietitian Initial Assessment for complete recommendations.   Scarlett Mi, [place], CDN #[id number]  Also available on [employer] Teams

## 2023-09-19 NOTE — PROGRESS NOTE ADULT - NS ATTEND AMEND GEN_ALL_CORE FT
As above.    Impression:    #1.  Hematochezia, now brown stool.  CT scan shows large stool burden with rectal thickening, likely stercoral ulcer but cannot exclude malignancy.  #2.  Anemia, likely multifactoral with bone marrow disease and blood loss and likely malignancy.  #3.  Weight loss, CT scan demonstrates diffuses bony lesions concerning for metastatic prostate cancer with elevated PSA  #4.  CAD and atrial fibrillation on Eliquis (held)      Recommendations:    #1.  Bowel regimen as above.  #2.  Will consider colonoscopy after constipation relieved if consistent with goals of care. As above.    Impression:    #1.  Hematochezia, now brown stool.  CT scan shows large stool burden with rectal thickening, likely stercoral ulcer but cannot exclude malignancy.  #2.  Anemia, likely multifactoral with bone marrow disease and blood loss and likely malignancy.  #3.  Weight loss, CT scan demonstrates diffuses bony lesions concerning for metastatic prostate cancer with elevated PSA  #4.  CAD and atrial fibrillation on Eliquis (held)      Recommendations:    #1.  Bowel regimen as above.  Will need enemas and possible manual disimpaction.  #2.  Will consider colonoscopy after constipation relieved if consistent with goals of care.

## 2023-09-19 NOTE — DIETITIAN INITIAL EVALUATION ADULT - ADD RECOMMEND
1) Recommend continue regular Kosher diet, texture per SLP recommendations/medical discretion.  2) Recommend addition of Ensure Plus High Protein 1 PO 3x daily (350 kcal, 20 gm protein per 8 oz serving); d/c Ensure Clear.  3) Monitor BMs, adjust bowel regimen as appropriate.  4) Obtain weekly weights.

## 2023-09-19 NOTE — PROGRESS NOTE ADULT - PROBLEM SELECTOR PLAN 2
Osseous mets found on CT A/P with enlarged prostate as suspected source. Notes 20-30 lb weight loss over past 2 months.  - F/u PSA  - GOC discussion initiated with family. Patient and spouse will decide together on decision to undergo further workup/treatment. Osseous mets found on CT A/P with enlarged prostate as suspected source. Notes 20-30 lb weight loss over past 2 months.  - F/u PSA - 345  - GOC discussion initiated with family. Patient and spouse will decide together on decision to undergo further workup/treatment.

## 2023-09-19 NOTE — DIETITIAN INITIAL EVALUATION ADULT - OTHER CALCULATIONS
Ideal Body Weight: 166 lbs / 75.5 kg +/-10%   Pt reports subjective 20-30 lb unintentional weight loss x2 months.  Weight history, per HIE: (9/19 dosing) 162.9 lbs / 73.9 kg, (5/18) 175 lbs, (2/15) 191 lbs.  Apparent ~28 lb (15%) weight loss x7 months, clinically significant.

## 2023-09-19 NOTE — PHYSICAL THERAPY INITIAL EVALUATION ADULT - PERTINENT HX OF CURRENT PROBLEM, REHAB EVAL
Patient is 88 year old M  with HTN, CAD S/P PCI stent in 2012, atrial fibrillation on Eliquis, and 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 CT on admission revealed diffuse osseous mets, with an enlarged prostate as the potential source

## 2023-09-19 NOTE — PROGRESS NOTE ADULT - SUBJECTIVE AND OBJECTIVE BOX
Patient is a 88y old  Male who presents with a chief complaint of LGIB (18 Sep 2023 11:00)    SUBJECTIVE / OVERNIGHT EVENTS: Patient seen and examined at bedside. No overnight events.    MEDICATIONS  (STANDING):  atorvastatin 20 milliGRAM(s) Oral at bedtime  influenza  Vaccine (HIGH DOSE) 0.7 milliLiter(s) IntraMuscular once  melatonin 3 milliGRAM(s) Oral at bedtime  polyethylene glycol 3350 17 Gram(s) Oral two times a day  polyethylene glycol/electrolyte Solution. 2000 milliLiter(s) Oral once  senna 2 Tablet(s) Oral daily  sertraline 25 milliGRAM(s) Oral daily    MEDICATIONS  (PRN):  albuterol    90 MICROgram(s) HFA Inhaler 2 Puff(s) Inhalation every 6 hours PRN for shortness of breath and/or wheezing    Vital Signs Last 24 Hrs  T(C): 36.3 (19 Sep 2023 05:18), Max: 36.8 (18 Sep 2023 19:23)  T(F): 97.4 (19 Sep 2023 05:18), Max: 98.2 (18 Sep 2023 19:23)  HR: 60 (19 Sep 2023 05:18) (59 - 64)  BP: 111/52 (19 Sep 2023 05:18) (93/48 - 111/70)  BP(mean): --  RR: 18 (19 Sep 2023 05:18) (18 - 20)  SpO2: 96% (19 Sep 2023 05:18) (96% - 100%)    Parameters below as of 19 Sep 2023 05:18  Patient On (Oxygen Delivery Method): room air    CAPILLARY BLOOD GLUCOSE    I&O's Summary    PHYSICAL EXAM:  GENERAL: NAD, lying in bed comfortably  HEAD:  Atraumatic, Normocephalic  EYES: EOMI, PERRLA, conjunctiva and sclera clear  ENT: Moist mucous membranes  NECK: Supple, No JVD  CHEST/LUNG: Clear to auscultation bilaterally; No rales, rhonchi, wheezing, or rubs. Unlabored respirations  HEART: Regular rate and rhythm; No murmurs, rubs, or gallops  ABDOMEN: BSx4; Soft, nontender, nondistended  EXTREMITIES:  2+ Peripheral Pulses, brisk capillary refill. No clubbing, cyanosis, or edema  NERVOUS SYSTEM:  A&Ox3, no focal deficits   SKIN: No rashes or lesions    LABS:                        10.4   12.06 )-----------( 224      ( 19 Sep 2023 05:29 )             32.5     09-19    x   |  x   |  19  ----------------------------<  87  x    |  23  |  0.76    Ca    8.1<L>      19 Sep 2023 05:29  Phos  3.7     09-19  Mg     2.10     09-19    TPro  4.8<L>  /  Alb  2.8<L>  /  TBili  0.6  /  DBili  x   /  AST  15  /  ALT  7   /  AlkPhos  396<H>  09-19    PT/INR - ( 18 Sep 2023 03:21 )   PT: 15.3 sec;   INR: 1.38 ratio       PTT - ( 18 Sep 2023 03:21 )  PTT:31.8 sec    Urinalysis Basic - ( 19 Sep 2023 05:29 )    Color: x / Appearance: x / SG: x / pH: x  Gluc: 87 mg/dL / Ketone: x  / Bili: x / Urobili: x   Blood: x / Protein: x / Nitrite: x   Leuk Esterase: x / RBC: x / WBC x   Sq Epi: x / Non Sq Epi: x / Bacteria: x    RADIOLOGY & ADDITIONAL TESTS:    Imaging Personally Reviewed:    Consultant(s) Notes Reviewed:      Care Discussed with Consultants/Other Providers:    Efraín Velasquez MD, Internal Medicine Resident Patient is a 88y old  Male who presents with a chief complaint of LGIB (18 Sep 2023 11:00)    SUBJECTIVE / OVERNIGHT EVENTS: Patient seen and examined at bedside. No overnight events. Patient still endorsing rectal pain this AM and is having frequent BM. Still having pain and discomfort from dental implants.    MEDICATIONS  (STANDING):  atorvastatin 20 milliGRAM(s) Oral at bedtime  influenza  Vaccine (HIGH DOSE) 0.7 milliLiter(s) IntraMuscular once  melatonin 3 milliGRAM(s) Oral at bedtime  polyethylene glycol 3350 17 Gram(s) Oral two times a day  polyethylene glycol/electrolyte Solution. 2000 milliLiter(s) Oral once  senna 2 Tablet(s) Oral daily  sertraline 25 milliGRAM(s) Oral daily    MEDICATIONS  (PRN):  albuterol    90 MICROgram(s) HFA Inhaler 2 Puff(s) Inhalation every 6 hours PRN for shortness of breath and/or wheezing    Vital Signs Last 24 Hrs  T(C): 36.3 (19 Sep 2023 05:18), Max: 36.8 (18 Sep 2023 19:23)  T(F): 97.4 (19 Sep 2023 05:18), Max: 98.2 (18 Sep 2023 19:23)  HR: 60 (19 Sep 2023 05:18) (59 - 64)  BP: 111/52 (19 Sep 2023 05:18) (93/48 - 111/70)  BP(mean): --  RR: 18 (19 Sep 2023 05:18) (18 - 20)  SpO2: 96% (19 Sep 2023 05:18) (96% - 100%)    Parameters below as of 19 Sep 2023 05:18  Patient On (Oxygen Delivery Method): room air    CAPILLARY BLOOD GLUCOSE    I&O's Summary    PHYSICAL EXAM:  GENERAL: NAD, lying in bed comfortably  HEAD:  Atraumatic, Normocephalic  EYES: EOMI, PERRLA, conjunctiva and sclera clear  ENT: Moist mucous membranes  NECK: Supple, No JVD  CHEST/LUNG: Clear to auscultation bilaterally; No rales, rhonchi, wheezing, or rubs. Unlabored respirations  HEART: Regular rate and rhythm; No murmurs, rubs, or gallops  ABDOMEN: BSx4; Soft, nontender, nondistended  EXTREMITIES:  2+ Peripheral Pulses, brisk capillary refill. No clubbing, cyanosis, or edema  NERVOUS SYSTEM:  A&Ox3, no focal deficits   SKIN: No rashes or lesions    LABS:                        10.4   12.06 )-----------( 224      ( 19 Sep 2023 05:29 )             32.5     09-19    x   |  x   |  19  ----------------------------<  87  x    |  23  |  0.76    Ca    8.1<L>      19 Sep 2023 05:29  Phos  3.7     09-19  Mg     2.10     09-19    TPro  4.8<L>  /  Alb  2.8<L>  /  TBili  0.6  /  DBili  x   /  AST  15  /  ALT  7   /  AlkPhos  396<H>  09-19    PT/INR - ( 18 Sep 2023 03:21 )   PT: 15.3 sec;   INR: 1.38 ratio       PTT - ( 18 Sep 2023 03:21 )  PTT:31.8 sec    Urinalysis Basic - ( 19 Sep 2023 05:29 )    Color: x / Appearance: x / SG: x / pH: x  Gluc: 87 mg/dL / Ketone: x  / Bili: x / Urobili: x   Blood: x / Protein: x / Nitrite: x   Leuk Esterase: x / RBC: x / WBC x   Sq Epi: x / Non Sq Epi: x / Bacteria: x    RADIOLOGY & ADDITIONAL TESTS:    Imaging Personally Reviewed:    Consultant(s) Notes Reviewed:      Care Discussed with Consultants/Other Providers:    Efraín Velasquez MD, Internal Medicine Resident

## 2023-09-19 NOTE — PROGRESS NOTE ADULT - ATTENDING COMMENTS
88M w/ HTN, CAD s/p stent (2012), AF (apixaban) s/p PPM, HFrEF (EF 30-25%), hemorrhoids presenting with BRBPR x 1 day as well as ongoing fatigue, nocturia, and 30 lb unintentional weight loss over the past several months found to have CT chest/abdomen/pelvis with stercoral colitis, heterogenous liver, enlarged, heterogeneous prostate and diffuse osseous metastases, and elevated PSA concerning for metastatic prostate cancer.     #Stercoral colitis c/b BRBPR - having BMs this morning with bowel regimen, no gross blood in stool, hgb stable; appreciate GI recommendations  #Likely metastatic prostate cancer - patient and wife struggling with whether pursuing further workup is within goals of care. Will consult oncology to discuss likely diagnosis with patient and wife and help guide diagnostic workup  #Dental discomfort - patient's biggest concern is oral/nasal discomfort after recent dental procedure, will consult dental to evaluate    Discussed with patient's wife at bedside

## 2023-09-19 NOTE — PROGRESS NOTE ADULT - SUBJECTIVE AND OBJECTIVE BOX
Interval Events: soft bps overnight- now improved, psa 345, hh stable. pt seen and examined, wife at bedside. c/o rectal discomfort from bms, mild nausea, no vomiting or abd pain, tolerated small amount of po yesterday. per rn, stool brown w/ slight blood tinge, has been sipping on golytely (though not checked off on mar)      Allergies:  No Known Allergies      Hospital Medications:  albuterol    90 MICROgram(s) HFA Inhaler 2 Puff(s) Inhalation every 6 hours PRN  atorvastatin 20 milliGRAM(s) Oral at bedtime  influenza  Vaccine (HIGH DOSE) 0.7 milliLiter(s) IntraMuscular once  melatonin 3 milliGRAM(s) Oral at bedtime  polyethylene glycol 3350 17 Gram(s) Oral two times a day  polyethylene glycol/electrolyte Solution. 2000 milliLiter(s) Oral once  senna 2 Tablet(s) Oral daily  sertraline 25 milliGRAM(s) Oral daily      ROS: As per HPI, otherwise 10-point ROS reviewed and negative.    Vital Signs:  Vital Signs Last 24 Hrs  T(C): 36.3 (19 Sep 2023 05:18), Max: 36.8 (18 Sep 2023 19:23)  T(F): 97.4 (19 Sep 2023 05:18), Max: 98.2 (18 Sep 2023 19:23)  HR: 60 (19 Sep 2023 05:18) (59 - 64)  BP: 111/52 (19 Sep 2023 05:18) (93/48 - 111/70)  BP(mean): --  RR: 18 (19 Sep 2023 05:18) (18 - 20)  SpO2: 96% (19 Sep 2023 05:18) (96% - 100%)    Parameters below as of 19 Sep 2023 05:18  Patient On (Oxygen Delivery Method): room air      Daily Height in cm: 177.8 (18 Sep 2023 19:23)    Daily         LABS:                        10.4   12.06 )-----------( 224      ( 19 Sep 2023 05:29 )             32.5     Mean Cell Volume: 87.1 fL (09-19-23 @ 05:29)    09-19    137  |  100  |  19  ----------------------------<  87  3.9   |  23  |  0.76    Ca    8.1<L>      19 Sep 2023 05:29  Phos  3.7     09-19  Mg     2.10     09-19    TPro  4.8<L>  /  Alb  2.8<L>  /  TBili  0.6  /  DBili  x   /  AST  15  /  ALT  7   /  AlkPhos  396<H>  09-19    LIVER FUNCTIONS - ( 19 Sep 2023 05:29 )  Alb: 2.8 g/dL / Pro: 4.8 g/dL / ALK PHOS: 396 U/L / ALT: 7 U/L / AST: 15 U/L / GGT: 14 U/L       PT/INR - ( 18 Sep 2023 03:21 )   PT: 15.3 sec;   INR: 1.38 ratio         PTT - ( 18 Sep 2023 03:21 )  PTT:31.8 sec  Urinalysis Basic - ( 19 Sep 2023 05:29 )    Color: x / Appearance: x / SG: x / pH: x  Gluc: 87 mg/dL / Ketone: x  / Bili: x / Urobili: x   Blood: x / Protein: x / Nitrite: x   Leuk Esterase: x / RBC: x / WBC x   Sq Epi: x / Non Sq Epi: x / Bacteria: x                              10.4   12.06 )-----------( 224      ( 19 Sep 2023 05:29 )             32.5                         10.2   11.26 )-----------( 238      ( 18 Sep 2023 10:40 )             31.2                         10.6   13.30 )-----------( 252      ( 18 Sep 2023 03:21 )             33.2       PHYSICAL EXAM  GENERAL: lying in bed, frail, chronically ill appearing, in no apparent distress  HEENT: NCAT  EYES: anicteric sclerae, moist conjunctivae  NECK: trachea midline, FROM  CHEST:  respirations even, non labored  ABDOMEN:  soft, non-tender, non-distended  EXTREMITIES: WWP  SKIN:  warm/dry, no visible rash appreciated  PSYCH: awake alert responsive  NEURO: no tremor noted

## 2023-09-19 NOTE — SWALLOW BEDSIDE ASSESSMENT ADULT - COMMENTS
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 He was seen by his outpatient cardiologist at the beginning of the month and was scheduled for CT C/A/P and labs to eval for occult malignancy given significant wt loss. GI consulted for further evaluation of hematochezia. HDS, labs showing normocytic anemia, hgb 10.6-->10.2, around prior baseline of ~11 in 2/2023, alp 457, UA w/ large blood, CTA AP neg for active gib but showing large stool burden throughout colon, rectum distended with stool measuring up to 8.4 cm with mild rectal wall thickening and surrounding stranding, no bowel obstruction, as well as diffuse osseous mets."    CT Chest 9/18/23: "LUNGS AND LARGE AIRWAYS: Patent central airways. Bilateral lower lobe subsegmental atelectasis. Branching nodule of the right lower lobe is likely impacted airway. There is bronchial wall thickening noted bilaterally, compatible with bronchiolitis."    Pt was received awake, alert and cooperative this AM. Congested cough appreciated at baseline. Spouse and daughter present at bedside. Pt denies oropharyngeal complaints although wife notes sensitivity to "hot" foods. Spouse also requesting ENT input due to R sided dental discomfort extending superiorly into region of R maxillary sinus.

## 2023-09-19 NOTE — PROGRESS NOTE ADULT - ASSESSMENT
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 He was seen by his outpatient cardiologist at the beginning of the month and was scheduled for CT C/A/P and labs to eval for occult malignancy given significant wt loss. GI consulted for further evaluation of hematochezia. HDS, labs showing normocytic anemia, hgb 10.6-->10.2, around prior baseline of ~11 in 2/2023, alp 457, UA w/ large blood, CTA AP neg for active gib but showing large stool burden throughout colon, rectum distended with stool measuring up to 8.4 cm with mild rectal wall thickening and surrounding stranding, no bowel obstruction, as well as diffuse osseous mets.    # acute on chronic hematochezia w/ large stool burden/stercoral colitis seen on imaging/[facility]- w/ reports of prior intermittent brb in stool now recently worsened, associated w/ prior constipation though stools now loose (possible overflow component), +AC use, last colon done remotely, p/w relatively stable HH around usual baseline, upon eval stools now brown w/ blood tinge, suspect likely outlet bleeding iso stercoral colitis/constipation however cannot definitively r/o potential malignancy   # elevated ALP - CT showing heterogeneous appearance of liver w/ suggestion of hypervascular foci which is non specific, most likely due to bone dz given normal GGT  # diffuse osseous mets on imaging - suspected prostatic source per medicine  # significant weight loss  # CAD sp PCI w/ stent 2012 - not on antiplts  # afib - eliquis currently on hold  # s/p PPM - interrogated in office 9/5/2023  # CM w/ EF 30-35%/mod MR/mod TR    Recommendations-   - continue aggressive bowel regimen- miralax (qd-tid, titrate as needed), senna qhs, enemas prn  - trend cbc, transfuse for hgb >/=8  - can obtain MRI to further evaluate liver findings  - f/u SLP input given oropharyngeal complaints  - no immediate plans for colonoscopy given stable HH, large stool burden (improving on bowel regimen), and newly diagnosed metastatic dz pending further GOC discussions; pls reach out to GI team when pt nearing medical optimization if colonoscopy is deemed to be within GOC (or sooner if overt gib recurs)  - rest of care as per primary team     *all recommendations are tentative until note is attested by attending*    DERECK Hartley PA-C, RD, CDN  available on TEAMS for questions    after 5pm/weekends, please contact the on-call GI fellow at [phone]  87 yo M  with HTN, CAD S/P PCI stent in 2012- not currently on antiplts, atrial fibrillation on Eliquis, 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 He was seen by his outpatient cardiologist at the beginning of the month and was scheduled for CT C/A/P and labs to eval for occult malignancy given significant wt loss. GI consulted for further evaluation of hematochezia. HDS, labs showing normocytic anemia, hgb 10.6-->10.2, around prior baseline of ~11 in 2/2023, alp 457, UA w/ large blood, CTA AP neg for active gib but showing large stool burden throughout colon, rectum distended with stool measuring up to 8.4 cm with mild rectal wall thickening and surrounding stranding, no bowel obstruction, as well as diffuse osseous mets.    # acute on chronic hematochezia w/ large stool burden/stercoral colitis seen on imaging/[facility]- w/ reports of prior intermittent brb in stool now recently worsened, associated w/ prior constipation though stools now loose (possible overflow component), +AC use, last colon done remotely, p/w relatively stable HH around usual baseline, upon eval stools now brown w/ blood tinge, suspect likely outlet bleeding iso stercoral colitis/constipation however cannot definitively r/o potential malignancy   # elevated ALP - CT showing heterogeneous appearance of liver w/ suggestion of hypervascular foci which is non specific, most likely due to bone dz given normal GGT  # diffuse osseous mets on imaging - suspected prostatic source per medicine  # significant weight loss  # CAD sp PCI w/ stent 2012 - not on antiplts  # afib - eliquis currently on hold  # s/p PPM - interrogated in office 9/5/2023  # CM w/ EF 30-35%/mod MR/mod TR    Recommendations-   - continue aggressive bowel regimen- miralax (qd-tid, titrate as needed), senna qhs, enemas prn  - trend cbc, transfuse for hgb >/=8  - can obtain MRI to further evaluate liver findings  - f/u SLP input given oropharyngeal complaints  - no immediate plans for colonoscopy given stable HH, large stool burden (improving on bowel regimen), and newly diagnosed metastatic dz pending further GOC discussions; pls reach out to GI team when pt nearing medical optimization if colonoscopy is deemed to be within GOC (or sooner if overt gib recurs)  - rest of care as per primary team     *all recommendations are tentative until note is attested by attending*  please reach out to GI team with any further questions/concerns    DERECK Hartley PA-C, RD, CDN  available on TEAMS for questions    after 5pm/weekends, please contact the on-call GI fellow at [phone]

## 2023-09-19 NOTE — DIETITIAN INITIAL EVALUATION ADULT - PERTINENT LABORATORY DATA
(9/19) Na 137, BUN 19, Cr 0.76, BG 87, K+ 3.9, Phos 3.7, Mg 2.10, Alk Phos 396<H>, ALT/SGPT 7, AST/SGOT 15

## 2023-09-20 NOTE — PROGRESS NOTE ADULT - SUBJECTIVE AND OBJECTIVE BOX
Patient is a 88y old  Male who presents with a chief complaint of Melena.    SUBJECTIVE / OVERNIGHT EVENTS: Patient seen and examined at bedside. No overnight events.    MEDICATIONS  (STANDING):  atorvastatin 20 milliGRAM(s) Oral at bedtime  influenza  Vaccine (HIGH DOSE) 0.7 milliLiter(s) IntraMuscular once  melatonin 3 milliGRAM(s) Oral at bedtime  polyethylene glycol 3350 17 Gram(s) Oral two times a day  senna 2 Tablet(s) Oral daily  sertraline 25 milliGRAM(s) Oral daily    MEDICATIONS  (PRN):  acetaminophen     Tablet .. 650 milliGRAM(s) Oral every 6 hours PRN Mild Pain (1 - 3)  albuterol    90 MICROgram(s) HFA Inhaler 2 Puff(s) Inhalation every 6 hours PRN for shortness of breath and/or wheezing  oxyCODONE    IR 5 milliGRAM(s) Oral every 6 hours PRN Severe Pain (7 - 10)  oxyCODONE    IR 2.5 milliGRAM(s) Oral every 6 hours PRN Moderate Pain (4 - 6)    Vital Signs Last 24 Hrs  T(C): 36.9 (20 Sep 2023 05:32), Max: 36.9 (20 Sep 2023 05:32)  T(F): 98.5 (20 Sep 2023 05:32), Max: 98.5 (20 Sep 2023 05:32)  HR: 72 (20 Sep 2023 05:32) (60 - 72)  BP: 120/58 (20 Sep 2023 05:32) (113/53 - 124/65)  BP(mean): --  RR: 18 (20 Sep 2023 05:32) (17 - 19)  SpO2: 98% (20 Sep 2023 05:32) (96% - 100%)    Parameters below as of 20 Sep 2023 05:32  Patient On (Oxygen Delivery Method): room air    CAPILLARY BLOOD GLUCOSE    I&O's Summary    19 Sep 2023 07:01  -  20 Sep 2023 07:00  --------------------------------------------------------  IN: 200 mL / OUT: 1 mL / NET: 199 mL    PHYSICAL EXAM:  GENERAL: NAD, lying in bed comfortably  HEAD:  Atraumatic, Normocephalic  EYES: EOMI, PERRLA, conjunctiva and sclera clear  ENT: Moist mucous membranes  NECK: Supple, No JVD  CHEST/LUNG: Clear to auscultation bilaterally; No rales, rhonchi, wheezing, or rubs. Unlabored respirations  HEART: Regular rate and rhythm; No murmurs, rubs, or gallops  ABDOMEN: BSx4; Soft, nontender, nondistended  EXTREMITIES:  2+ Peripheral Pulses, brisk capillary refill. No clubbing, cyanosis, or edema  NERVOUS SYSTEM:  A&Ox3, no focal deficits   SKIN: No rashes or lesions    LABS:                        10.4   13.09 )-----------( 253      ( 20 Sep 2023 04:43 )             31.8     09-20    136  |  102  |  19  ----------------------------<  98  4.1   |  25  |  0.71    Ca    8.1<L>      20 Sep 2023 04:43  Phos  3.4     09-20  Mg     2.00     09-20    TPro  5.6<L>  /  Alb  2.9<L>  /  TBili  0.5  /  DBili  x   /  AST  13  /  ALT  5   /  AlkPhos  410<H>  09-20    Urinalysis Basic - ( 20 Sep 2023 04:43 )    Color: x / Appearance: x / SG: x / pH: x  Gluc: 98 mg/dL / Ketone: x  / Bili: x / Urobili: x   Blood: x / Protein: x / Nitrite: x   Leuk Esterase: x / RBC: x / WBC x   Sq Epi: x / Non Sq Epi: x / Bacteria: x    RADIOLOGY & ADDITIONAL TESTS:    Imaging Personally Reviewed:    Consultant(s) Notes Reviewed:      Care Discussed with Consultants/Other Providers:    Efraín Velasquez MD, Internal Medicine Resident Patient is a 88y old  Male who presents with a chief complaint of Melena.    SUBJECTIVE / OVERNIGHT EVENTS: Patient seen and examined at bedside. No overnight events. Patient still with rectal pain this AM, unimproved compared to day prior. [facility] discussed further at bedside, patient still undecided on whether to pursue biopsy and treatment.    MEDICATIONS  (STANDING):  atorvastatin 20 milliGRAM(s) Oral at bedtime  influenza  Vaccine (HIGH DOSE) 0.7 milliLiter(s) IntraMuscular once  melatonin 3 milliGRAM(s) Oral at bedtime  polyethylene glycol 3350 17 Gram(s) Oral two times a day  senna 2 Tablet(s) Oral daily  sertraline 25 milliGRAM(s) Oral daily    MEDICATIONS  (PRN):  acetaminophen     Tablet .. 650 milliGRAM(s) Oral every 6 hours PRN Mild Pain (1 - 3)  albuterol    90 MICROgram(s) HFA Inhaler 2 Puff(s) Inhalation every 6 hours PRN for shortness of breath and/or wheezing  oxyCODONE    IR 5 milliGRAM(s) Oral every 6 hours PRN Severe Pain (7 - 10)  oxyCODONE    IR 2.5 milliGRAM(s) Oral every 6 hours PRN Moderate Pain (4 - 6)    Vital Signs Last 24 Hrs  T(C): 36.9 (20 Sep 2023 05:32), Max: 36.9 (20 Sep 2023 05:32)  T(F): 98.5 (20 Sep 2023 05:32), Max: 98.5 (20 Sep 2023 05:32)  HR: 72 (20 Sep 2023 05:32) (60 - 72)  BP: 120/58 (20 Sep 2023 05:32) (113/53 - 124/65)  BP(mean): --  RR: 18 (20 Sep 2023 05:32) (17 - 19)  SpO2: 98% (20 Sep 2023 05:32) (96% - 100%)    Parameters below as of 20 Sep 2023 05:32  Patient On (Oxygen Delivery Method): room air    CAPILLARY BLOOD GLUCOSE    I&O's Summary    19 Sep 2023 07:01  -  20 Sep 2023 07:00  --------------------------------------------------------  IN: 200 mL / OUT: 1 mL / NET: 199 mL    PHYSICAL EXAM:  GENERAL: NAD, lying in bed comfortably  HEAD:  Atraumatic, Normocephalic  EYES: EOMI, PERRLA, conjunctiva and sclera clear  ENT: Moist mucous membranes  NECK: Supple, No JVD  CHEST/LUNG: Clear to auscultation bilaterally; No rales, rhonchi, wheezing, or rubs. Unlabored respirations  HEART: Regular rate and rhythm; No murmurs, rubs, or gallops  ABDOMEN: BSx4; Soft, nontender, nondistended  EXTREMITIES:  2+ Peripheral Pulses, brisk capillary refill. No clubbing, cyanosis, or edema  NERVOUS SYSTEM:  A&Ox3, no focal deficits   SKIN: No rashes or lesions    LABS:                        10.4   13.09 )-----------( 253      ( 20 Sep 2023 04:43 )             31.8     09-20    136  |  102  |  19  ----------------------------<  98  4.1   |  25  |  0.71    Ca    8.1<L>      20 Sep 2023 04:43  Phos  3.4     09-20  Mg     2.00     09-20    TPro  5.6<L>  /  Alb  2.9<L>  /  TBili  0.5  /  DBili  x   /  AST  13  /  ALT  5   /  AlkPhos  410<H>  09-20    Urinalysis Basic - ( 20 Sep 2023 04:43 )    Color: x / Appearance: x / SG: x / pH: x  Gluc: 98 mg/dL / Ketone: x  / Bili: x / Urobili: x   Blood: x / Protein: x / Nitrite: x   Leuk Esterase: x / RBC: x / WBC x   Sq Epi: x / Non Sq Epi: x / Bacteria: x    RADIOLOGY & ADDITIONAL TESTS:    Imaging Personally Reviewed:    Consultant(s) Notes Reviewed:      Care Discussed with Consultants/Other Providers:    Efraín Velasquez MD, Internal Medicine Resident

## 2023-09-20 NOTE — PROGRESS NOTE ADULT - ATTENDING COMMENTS
88M w/ HTN, CAD s/p stent (2012), AF (apixaban) s/p PPM, HFrEF (EF 30-25%), hemorrhoids presenting with BRBPR x 1 day as well as ongoing fatigue, nocturia, and 30 lb unintentional weight loss over the past several months found to have CT chest/abdomen/pelvis with stercoral colitis, heterogenous liver, enlarged, heterogeneous prostate and diffuse osseous metastases, and elevated PSA concerning for metastatic prostate cancer.     #Stercoral colitis c/b BRBPR - continues to have BMs, no gross blood in stool, hgb stable; appreciate GI recommendations  #Likely metastatic prostate cancer - patient and wife struggling with whether pursuing further workup is within goals of care. Will consult palliative care to help with goals of care conversation and decisions regarding whether or not to pursue bone biopsy  #Dental discomfort - dental eval today    Discussed with patient's wife and daughter at bedside 88M w/ HTN, CAD s/p stent (2012), AF (apixaban) s/p PPM, HFrEF (EF 30-25%), hemorrhoids presenting with BRBPR x 1 day as well as ongoing fatigue, nocturia, and 30 lb unintentional weight loss over the past several months found to have CT chest/abdomen/pelvis with stercoral colitis, heterogenous liver, enlarged, heterogeneous prostate and diffuse osseous metastases, and elevated PSA concerning for metastatic prostate cancer.     #Stercoral colitis c/b BRBPR - continues to have BMs, no gross blood in stool, hgb stable; appreciate GI recommendations  #Likely metastatic prostate cancer - patient and wife struggling with whether pursuing further workup is within goals of care. Will consult palliative care to help with goals of care conversation and decisions regarding whether or not to pursue bone biopsy  #Dental discomfort - dental eval today    Discussed with patient's wife and daughter at bedside  Discused with palliative care attending

## 2023-09-20 NOTE — PROGRESS NOTE ADULT - PROBLEM SELECTOR PLAN 2
Osseous mets found on CT A/P with enlarged prostate as suspected source. Notes 20-30 lb weight loss over past 2 months.  - F/u PSA - 345  - GOC discussion initiated with family. Patient and spouse will decide together on decision to undergo further workup/treatment.

## 2023-09-20 NOTE — CONSULT NOTE ADULT - SUBJECTIVE AND OBJECTIVE BOX
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     Patient reports that he noted dark red blood in stool during bowel movements yesterday and today, soaking multiple towels. As per wife, patient had one episode of bleeding on bedsheet 2 months ago after taking one pill of Advil. Admits to rectal pain and urinary frequency during this time and currently as well. Notes that he has had a cough since a dental procedure in July. Admits to 20-30 pound weight loss during this time. Denies any fever, chills, n/v/d, abdominal pain, sob, palpitation, or any other complaints.    Dental HPI:  Patient is poor historian. Patient stated that he had implants placed years ago and has been having facial pain ever since. Wife states that patient glued in his implant bridge with crazy glue. Unable to attain appropriate dental history due to patients lack of recall of events.    ED Course: Labs drawn, FOBT and UA sent  Vital Signs on Presentation:  Temp: 97.8 F ( 36.6 C) | BP: 129/65 mmHg | HR: 65 | RR: 16 | SpO2: 100%  Imaging: CT A/P showing heterogenous, enlarged prostate along with thick-walled appearance of bladder. Distention of the rectum with stool with associated rectal wall   thickening and inflammatory changes, consistent with stercoral colitis. Heterogeneous appearance of the liver and right hepatic lobe hemangioma. Diffuse osseous metastasis.  Medications Given: N/A (18 Sep 2023 09:02)      PAST MEDICAL & SURGICAL HISTORY:  Complete atrioventricular block  Complete heart block      Atherosclerosis of coronary artery  CAD (coronary artery disease)      Hyperlipidemia  HLD (hyperlipidemia)      Essential hypertension  HTN (hypertension)      History of total knee replacement  S/P knee replacement, bilateral    MEDICATIONS  (STANDING):  atorvastatin 20 milliGRAM(s) Oral at bedtime  guaiFENesin Oral Liquid (Sugar-Free) 200 milliGRAM(s) Oral every 6 hours  influenza  Vaccine (HIGH DOSE) 0.7 milliLiter(s) IntraMuscular once  melatonin 3 milliGRAM(s) Oral at bedtime  polyethylene glycol 3350 17 Gram(s) Oral two times a day  senna 2 Tablet(s) Oral daily  sertraline 25 milliGRAM(s) Oral daily    MEDICATIONS  (PRN):  acetaminophen     Tablet .. 650 milliGRAM(s) Oral every 6 hours PRN Mild Pain (1 - 3)  albuterol    90 MICROgram(s) HFA Inhaler 2 Puff(s) Inhalation every 6 hours PRN for shortness of breath and/or wheezing  oxyCODONE    IR 5 milliGRAM(s) Oral every 6 hours PRN Severe Pain (7 - 10)  oxyCODONE    IR 2.5 milliGRAM(s) Oral every 6 hours PRN Moderate Pain (4 - 6)      Allergies    No Known Allergies          FAMILY HISTORY:  Family history of ischemic heart disease  Family history of myocardial infarction        Vital Signs Last 24 Hrs  T(C): 36.5 (20 Sep 2023 09:30), Max: 36.9 (20 Sep 2023 05:32)  T(F): 97.7 (20 Sep 2023 09:30), Max: 98.5 (20 Sep 2023 05:32)  HR: 60 (20 Sep 2023 09:30) (60 - 72)  BP: 106/62 (20 Sep 2023 09:30) (106/62 - 123/53)  BP(mean): --  RR: 18 (20 Sep 2023 09:30) (17 - 19)  SpO2: 98% (20 Sep 2023 09:30) (96% - 98%)    Parameters below as of 20 Sep 2023 09:30  Patient On (Oxygen Delivery Method): room air        EOE:               Mandible FROM             Facial bones and MOM grossly intact             (-) trismus             (-) LAD             (-) swelling             (-) asymmetry             (-) palpation             (-) SOB             (-) dysphagia             (-) LOC    IOE:  permanent dentition: missing multiple teeth, maxilla has 4 implants placed on right side           tongue/FOM WNL           labial/buccal mucosa WNL           (-) percussion           (-) palpation           (-) swelling     Dentition present: Edentulous maxilla with 4  implants on right side      Radiographs: Unable to attain radiographs. patient not transportable    LABS:                        10.4   13.09 )-----------( 253      ( 20 Sep 2023 04:43 )             31.8     09-20    136  |  102  |  19  ----------------------------<  98  4.1   |  25  |  0.71    Ca    8.1<L>      20 Sep 2023 04:43  Phos  3.4     09-20  Mg     2.00     09-20    TPro  5.6<L>  /  Alb  2.9<L>  /  TBili  0.5  /  DBili  x   /  AST  13  /  ALT  5   /  AlkPhos  410<H>  09-20    WBC Count: 13.09 K/uL *H* [3.80 - 10.50] (09-20 @ 04:43)    Platelet Count - Automated: 253 K/uL [150 - 400] (09-20 @ 04:43)  Platelet Count - Automated: 224 K/uL [150 - 400] (09-19 @ 05:29)      Culture Results:   >=3 organisms. Probable collection contamination. (09-18 @ 08:58)    Urinalysis Basic - ( 20 Sep 2023 04:43 )    Color: x / Appearance: x / SG: x / pH: x  Gluc: 98 mg/dL / Ketone: x  / Bili: x / Urobili: x   Blood: x / Protein: x / Nitrite: x   Leuk Esterase: x / RBC: x / WBC x   Sq Epi: x / Non Sq Epi: x / Bacteria: x        ASSESSMENT:  No signs of acute or chronic infection. No tenderness to palpation, erythema, or edema around implant sites.     PROCEDURE:  Verbal consent given to perform limited bedside exam.     RECOMMENDATIONS:   1) Dental F/U with outpatient dentist for comprehensive dental care.       Lazarus Farris DDS #[id number]  Polo Boo DDS #[id number]

## 2023-09-20 NOTE — PROGRESS NOTE ADULT - PROBLEM SELECTOR PLAN 3
Patient having pain and discomfort at site of recent dental implants and along right nasal sinuses.  - Visible swelling on exam  - Dental consulted; will be seen inpatient at dental clinic Patient having pain and discomfort at site of recent dental implants and along right nasal sinuses.  - Visible swelling on exam  - Dental consulted; no inpatient intervention indicated, will follow-up outpatient.

## 2023-09-21 NOTE — DISCHARGE NOTE PROVIDER - HOSPITAL COURSE
88M w/ HTN, CAD s/p stent (2012), AF (apixaban) s/p PPM, HFrEF (EF 30-25%), hemorrhoids presenting with BRBPR x 1 day as well as ongoing fatigue, nocturia, and 30 lb unintentional weight loss over the past several months. Admitted to internal medicine for further management.     Found to have CT chest/abdomen/pelvis with stercoral colitis, heterogenous liver, enlarged, heterogeneous prostate and diffuse osseous metastases, and elevated PSA concerning for metastatic prostate cancer. After bowel regimen patient started having bowel movements. Hemoglobin remained stable during hospitalization.     Patient and family decided to proceed with bone biopsy which was done on 9/25 with interventional radiology. Patient now stable for discharge home with home PT.     New Medications: Miralax PRN for constipation     Follow up: PCP in one week, [facility] after biopsy results

## 2023-09-21 NOTE — PROGRESS NOTE ADULT - PROBLEM SELECTOR PLAN 3
Patient having pain and discomfort at site of recent dental implants and along right nasal sinuses.  - Visible swelling on exam  - Dental consulted; no inpatient intervention indicated, will follow-up outpatient.

## 2023-09-21 NOTE — DISCHARGE NOTE PROVIDER - CARE PROVIDER_API CALL
Jairo Guerrero  Cardiology  [address] [place] [address]  Phone: [phone]  Fax: [phone]  Follow Up Time: 1 week

## 2023-09-21 NOTE — CONSULT NOTE ADULT - PROBLEM SELECTOR RECOMMENDATION 9
-CT Abd/ Pelvis 9/18- Constipation. Distention of the rectum with stool with associated rectal wall thickening and inflammatory changes, consistent with stercoral colitis.  - GI recommendations appreciated  - management as per primary team

## 2023-09-21 NOTE — CONSULT NOTE ADULT - SUBJECTIVE AND OBJECTIVE BOX
Date of Service 09-21-23 @ 16:09    HPI:  89 yo M  with HTN, CAD S/P PCI stent in 2012, atrial fibrillation on Eliquis, and high degree / complete AV block s/p PPM MDT implant 8/17/15, hemorrhoid presents to ED c/o bloody stools since yesterday.     Patient reports that he noted dark red blood in stool during bowel movements yesterday and today, soaking multiple towels. As per wife, patient had one episode of bleeding on bedsheet 2 months ago after taking one pill of Advil. Admits to rectal pain and urinary frequency during this time and currently as well. Notes that he has had a cough since a dental procedure in July. Admits to 20-30 pound weight loss during this time. Denies any fever, chills, n/v/d, abdominal pain, sob, palpitation, or any other complaints.    ED Course: Labs drawn, FOBT and UA sent  Vital Signs on Presentation:  Temp: 97.8 F ( 36.6 C) | BP: 129/65 mmHg | HR: 65 | RR: 16 | SpO2: 100%  Imaging: CT A/P showing heterogenous, enlarged prostate along with thick-walled appearance of bladder. Distention of the rectum with stool with associated rectal wall   thickening and inflammatory changes, consistent with stercoral colitis. Heterogeneous appearance of the liver and right hepatic lobe hemangioma. Diffuse osseous metastasis.  Medications Given: N/A (18 Sep 2023 09:02)      PERTINENT PM/SXH:   Complete atrioventricular block  Atherosclerosis of coronary artery  Hyperlipidemia  Essential hypertension  History of total knee replacement    FAMILY HISTORY:  Family history of ischemic heart disease  Family history of myocardial infarction    ITEMS NOT CHECKED ARE NOT PRESENT    SOCIAL HISTORY:   Significant other/partner[ x]  Children[x ]  Spiritism/Spirituality:  Substance hx:  [ ]   Tobacco hx:  [ ]   Alcohol hx: [ ]   Home Opioid hx:  [x ] I-Stop Reference No:  [id number]  Prescription Information      PDI Filter:    PDI	Current Rx	Drug Type	Rx Written	Rx Dispensed	Drug	Quantity	Days Supply	Prescriber Name	Prescriber SOUMYA #	Payment Method	Dispenser  A	N	O	08/07/2023	08/07/2023	acetaminophen-cod #3 tablet	16	4	Cho, Oz Pae	[id number]	Medicare	[facility] [id number]    Living Situation: [ x]Home  [ ]Long term care  [ ]Rehab [ ]Other      ADVANCE DIRECTIVES:    MOLST  [ ]  Living Will  [ ]   DECISION MAKER(s):  [ ] Health Care Proxy(s)  [x ] Surrogate(s)  [ ] Guardian           Name(s): Phone Number(s):  Fred Reynoso: [phone]    BASELINE (I)ADL(s) (prior to admission):  [place]: [ x]Total  [ ] Moderate [ ]Dependent    Allergies    No Known Allergies    Intolerances    MEDICATIONS  (STANDING):  albuterol/ipratropium for Nebulization 3 milliLiter(s) Nebulizer every 6 hours  atorvastatin 20 milliGRAM(s) Oral at bedtime  guaiFENesin Oral Liquid (Sugar-Free) 200 milliGRAM(s) Oral every 6 hours  influenza  Vaccine (HIGH DOSE) 0.7 milliLiter(s) IntraMuscular once  melatonin 3 milliGRAM(s) Oral at bedtime  polyethylene glycol 3350 17 Gram(s) Oral two times a day  senna 2 Tablet(s) Oral daily  sertraline 25 milliGRAM(s) Oral daily    MEDICATIONS  (PRN):  acetaminophen     Tablet .. 650 milliGRAM(s) Oral every 6 hours PRN Mild Pain (1 - 3)  oxyCODONE    IR 2.5 milliGRAM(s) Oral every 6 hours PRN Moderate Pain (4 - 6)  oxyCODONE    IR 5 milliGRAM(s) Oral every 6 hours PRN Severe Pain (7 - 10)        ITEMS UNCHECKED ARE NOT PRESENT     PRESENT SYMPTOMS: [ ]Unable to self-report due to altered mental status  [ ] CPOT [ ] PAINADs [ ] RDOS  Source if other than patient:  [ ]Family   [ ]Team     Pain: [x ]yes [ ]no  QOL impact - mild  Location -     b/l knees               Aggravating factors - movement   Quality -  Radiation - none   Timing- intermittent   Severity (0-10 scale):  Minimal acceptable level / Pain goal (0-10 scale):     CPOT:    https://www.sccm.org/getattachment/oer52q30-7a1w-2l3j-5z3r-1301x1078k7b/Critical-Care-Pain-Observation-Tool-(CPOT)    Dyspnea:                           [ ]Mild [ ]Moderate [ ]Severe  Anxiety:                             [ ]Mild [ ]Moderate [ ]Severe  Agitation:                          [ ]Mild [ ]Moderate [ ]Severe  Fatigue:                             [ ]Mild [ ]Moderate [ ]Severe  Nausea:                             [ ]Mild [ ]Moderate [ ]Severe  Loss of appetite:              [ ]Mild [ ]Moderate [ ]Severe  Constipation:                   [ ]Mild [ ]Moderate [ ]Severe  Diarrhea:                          [ ]Mild [ ]Moderate [ ]Severe  Pruritus:                            [ ]Mild [ ]Moderate [ ]Severe  Depression:                      [ ]Mild [ ]Moderate [ ]Severe    PCSSQ[Palliative Care Spiritual Screening Question]   Severity (0-10):  Score of 4 or > indicate consideration of Chaplaincy referral.  Chaplaincy Referral: [ ] yes [ ] refused [ ] following [ x] deferred    Caregiver [place]tuck? : [ ] yes [ ] no [ ] Declined   [x ] Deferred            Social work referral [ ] Patient & Family Centered Care Referral [ ]     Anticipatory Grief present?:  [ ] yes [ ] no  [x ] Deferred                  Social work referral [ ] Chaplaincy Referral[ ]    Other Symptoms:  [ x]All other review of systems negative     PHYSICAL EXAM:  Vital Signs Last 24 Hrs  T(C): 36.7 (21 Sep 2023 06:27), Max: 36.7 (21 Sep 2023 06:27)  T(F): 98 (21 Sep 2023 06:27), Max: 98 (21 Sep 2023 06:27)  HR: 62 (21 Sep 2023 06:27) (62 - 64)  BP: 116/60 (21 Sep 2023 06:27) (107/60 - 116/69)  BP(mean): --  RR: 17 (21 Sep 2023 06:27) (17 - 18)  SpO2: 95% (21 Sep 2023 06:27) (95% - 97%)    Parameters below as of 21 Sep 2023 06:27  Patient On (Oxygen Delivery Method): room air         I&O's Summary    20 Sep 2023 07:01  -  21 Sep 2023 07:00  --------------------------------------------------------  IN: 480 mL / OUT: 0 mL / NET: 480 mL        GENERAL:  [ x]Alert  [x ]Oriented x3   [ ]Lethargic  [ ]Cachexia  [ ]Unarousable  [x ]Verbal  [ ]Non-Verbal  [ x] No Distress  Behavioral: Tangential in speech   [ ] Anxiety  [ ] Delirium [ ] Agitation [x ] Calm  [ ] Other  HEENT:  [x ]Normal  [ ] Temporal Wasting  [ ]Dry mouth   [ ]ET Tube/Trach  [ ]Oral lesions  [ ] Mucositis  PULMONARY:   [ ]Clear [ ]Tachypnea  [ ]Audible excessive secretions   [ ]Rhonchi        [ ]Right [ ]Left [ ]Bilateral  [ ]Crackles        [ ]Right [ ]Left [ ]Bilateral  [ ]Wheezing     [ ]Right [ ]Left [ ]Bilateral  [ x]Diminished breath sounds [ ]right [ ]left [ x]bilateral  CARDIOVASCULAR:    [x ]Regular [ ]Irregular [ ]Tachy  [ ]Trevon [ ]Murmur [ ]Other  GASTROINTESTINAL:  [ x]Soft  [ ]Distended   [ ]+BS  [x ]Non tender [ ]Tender  [ ]PEG [ ]OGT/ NGT  Last BM:   GENITOURINARY:  [ ]Normal [ x] Incontinent   [ ]Oliguria/Anuria   [ ]Rodriguez  MUSCULOSKELETAL:   [ x]Normal   [ x]Weakness  [ ]Bed/Wheelchair bound [ ]Edema  [  ] amputation  [  ] contraction  NEUROLOGIC:   [x ]No focal deficits  [ ]Cognitive impairment  [ ]Dysphagia [ ]Dysarthria [ ]Paresis [ ]Other   SKIN: See Nursing Skin Assessment for further details  [x ]Normal    [ ]Rash  [ ]Pressure ulcer(s)       Present on admission [ ]y [ ]n   [  ]  Wound    [  ] hyperpigmentation    CRITICAL CARE:  [ ] Shock Present  [ ]Septic [ ]Cardiogenic [ ]Neurologic [ ]Hypovolemic  [ ]  Vasopressors [ ]  Inotropes   [ ]Respiratory failure present [ ]Mechanical ventilation [ ]Non-invasive ventilatory support [ ]High flow    [ ]Acute  [ ]Chronic [ ]Hypoxic  [ ]Hypercarbic [ ]Other  [ ]Other organ failure     LABS:  reviewed                         10.5   10.77 )-----------( 258      ( 21 Sep 2023 07:10 )             32.3   09-21    135  |  99  |  20  ----------------------------<  95  4.7   |  28  |  0.75    Ca    8.1<L>      21 Sep 2023 07:10  Phos  3.3     09-21  Mg     2.10     09-21    TPro  5.6<L>  /  Alb  3.0<L>  /  TBili  0.4  /  DBili  x   /  AST  13  /  ALT  9   /  AlkPhos  402<H>  09-21    Urinalysis Basic - ( 21 Sep 2023 07:10 )    Color: x / Appearance: x / SG: x / pH: x  Gluc: 95 mg/dL / Ketone: x  / Bili: x / Urobili: x   Blood: x / Protein: x / Nitrite: x   Leuk Esterase: x / RBC: x / WBC x   Sq Epi: x / Non Sq Epi: x / Bacteria: x      CAPILLARY BLOOD GLUCOSE      POCT Blood Glucose.: 109 mg/dL (21 Sep 2023 00:44)      RADIOLOGY & ADDITIONAL STUDIES: reviewed     PROTEIN CALORIE MALNUTRITION PRESENT: [ ]mild [ ]moderate [ ]severe [ ]underweight [ ]morbid obesity  https://www.andeal.org/vault/2440/web/files/ONC/Table_Clinical%20Characteristics%20to%20Document%20Malnutrition-White%20JV%20et%20al%202012.pdf    Height (cm): 177.8 (09-18-23 @ 19:23)  Weight (kg): 73.9 (09-18-23 @ 19:23)  BMI (kg/m2): 23.4 (09-18-23 @ 19:23)    [ ]PPSV2 < or = to 30% [ ]significant weight loss  [ ]poor nutritional intake  [ ]anasarca [ ]Artificial Nutrition      REFERRALS:   [ ]Chaplaincy  [ ]Hospice  [ ]Child Life  [ ]Social Work  [ x]Case management [ ]Holistic Therapy

## 2023-09-21 NOTE — CONSULT NOTE ADULT - SUBJECTIVE AND OBJECTIVE BOX
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 CT on admission revealed diffuse osseous mets, with an enlarged prostate as the potential source. Pending GOC discussion with patient and spouse to decide treatment vs comfort care. IR consulted for bone metastates biopsy.       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 CT on admission revealed diffuse osseous mets, with an enlarged prostate as the potential source. Pending GOC discussion with patient and spouse to decide treatment vs comfort care. IR consulted for metastatic bone lesion biopsy       -- Awaiting coordination of goals of care with family and oncology prior to proceeding with biopsy at this time. The risk of biopsy must be considered and pursued if it will impact treatment plan.    IR will continue to follow.     --  Chris Osullivan, DO  PGY-2 Vascular and Interventional Radiology Resident   Available on Microsoft Teams    - Non-emergent consults: Place IR consult order in [place]  - Emergent issues (pager): [facility] [phone]; [place] [facility] -[phone]; [phone]  - Scheduling questions: [facility] [phone]; [place] [facility] -[phone]  - Clinic/outpatient booking: [facility] [phone]; [place] [facility] -[phone] Interventional Radiology    Evaluate for Procedure: [facility] metastasis biopsy     HPI: 87 yo M  with HTN, CAD S/P PCI stent in 2012, atrial fibrillation on Eliquis, and 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 CT on admission revealed diffuse osseous mets, with an enlarged prostate as the potential source. Pending GOC discussion with patient and spouse to decide treatment vs comfort care. IR consulted for bone metastates biopsy.       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 CT on admission revealed diffuse osseous mets, with an enlarged prostate as the potential source. Pending GOC discussion with patient and spouse to decide treatment vs comfort care. IR consulted for metastatic bone lesion biopsy       -- Awaiting coordination of goals of care with family and oncology prior to proceeding with biopsy at this time. The risks and benefits of biopsy should be considered.   -- Biopsy can be performed if the patient wishes to pursue therapy based on results/confirmation of metastatic disease and is a candidate for DMT if determined by Heme Onc.    IR will continue to follow.     --  Chris Osullivan, DO  PGY-2 Vascular and Interventional Radiology Resident   Available on [employer] Teams    - Non-emergent consults: Place IR consult order in [employer]  - Emergent issues (pager): [facility] [phone]; [facility] [phone]; [phone]  - Scheduling questions: [facility] [phone]; [facility] [phone]  - Clinic/outpatient booking: [facility] [phone]; [facility] [phone]

## 2023-09-21 NOTE — PROGRESS NOTE ADULT - SUBJECTIVE AND OBJECTIVE BOX
Patient is a 88y old  Male who presents with a chief complaint of LGIB (20 Sep 2023 13:52)    SUBJECTIVE / OVERNIGHT EVENTS: Patient seen and examined at bedside. Patient noted to have burning on urination overnight. CTX initiated, UA and urine culture drawn.    MEDICATIONS  (STANDING):  atorvastatin 20 milliGRAM(s) Oral at bedtime  cefTRIAXone   IVPB 1000 milliGRAM(s) IV Intermittent every 24 hours  guaiFENesin Oral Liquid (Sugar-Free) 200 milliGRAM(s) Oral every 6 hours  influenza  Vaccine (HIGH DOSE) 0.7 milliLiter(s) IntraMuscular once  melatonin 3 milliGRAM(s) Oral at bedtime  polyethylene glycol 3350 17 Gram(s) Oral two times a day  senna 2 Tablet(s) Oral daily  sertraline 25 milliGRAM(s) Oral daily    MEDICATIONS  (PRN):  acetaminophen     Tablet .. 650 milliGRAM(s) Oral every 6 hours PRN Mild Pain (1 - 3)  albuterol    90 MICROgram(s) HFA Inhaler 2 Puff(s) Inhalation every 6 hours PRN for shortness of breath and/or wheezing  oxyCODONE    IR 5 milliGRAM(s) Oral every 6 hours PRN Severe Pain (7 - 10)  oxyCODONE    IR 2.5 milliGRAM(s) Oral every 6 hours PRN Moderate Pain (4 - 6)    Vital Signs Last 24 Hrs  T(C): 36.7 (21 Sep 2023 06:27), Max: 37.4 (20 Sep 2023 13:54)  T(F): 98 (21 Sep 2023 06:27), Max: 99.3 (20 Sep 2023 13:54)  HR: 62 (21 Sep 2023 06:27) (60 - 64)  BP: 116/60 (21 Sep 2023 06:27) (106/62 - 125/79)  BP(mean): --  RR: 17 (21 Sep 2023 06:27) (17 - 18)  SpO2: 95% (21 Sep 2023 06:27) (92% - 98%)    Parameters below as of 21 Sep 2023 06:27  Patient On (Oxygen Delivery Method): room air    CAPILLARY BLOOD GLUCOSE  POCT Blood Glucose.: 109 mg/dL (21 Sep 2023 00:44)    I&O's Summary    20 Sep 2023 07:01  -  21 Sep 2023 07:00  --------------------------------------------------------  IN: 480 mL / OUT: 0 mL / NET: 480 mL    PHYSICAL EXAM:  GENERAL: NAD, lying in bed comfortably  HEAD:  Atraumatic, Normocephalic  EYES: EOMI, PERRLA, conjunctiva and sclera clear  ENT: Moist mucous membranes  NECK: Supple, No JVD  CHEST/LUNG: Clear to auscultation bilaterally; No rales, rhonchi, wheezing, or rubs. Unlabored respirations  HEART: Regular rate and rhythm; No murmurs, rubs, or gallops  ABDOMEN: BSx4; Soft, nontender, nondistended  EXTREMITIES:  2+ Peripheral Pulses, brisk capillary refill. No clubbing, cyanosis, or edema  NERVOUS SYSTEM:  A&Ox3, no focal deficits   SKIN: No rashes or lesions    LABS:                        10.4   13.09 )-----------( 253      ( 20 Sep 2023 04:43 )             31.8     09-20    136  |  102  |  19  ----------------------------<  98  4.1   |  25  |  0.71    Ca    8.1<L>      20 Sep 2023 04:43  Phos  3.4     09-20  Mg     2.00     09-20    TPro  5.6<L>  /  Alb  2.9<L>  /  TBili  0.5  /  DBili  x   /  AST  13  /  ALT  5   /  AlkPhos  410<H>  09-20    Urinalysis Basic - ( 20 Sep 2023 04:43 )    Color: x / Appearance: x / SG: x / pH: x  Gluc: 98 mg/dL / Ketone: x  / Bili: x / Urobili: x   Blood: x / Protein: x / Nitrite: x   Leuk Esterase: x / RBC: x / WBC x   Sq Epi: x / Non Sq Epi: x / Bacteria: x    RADIOLOGY & ADDITIONAL TESTS:    Imaging Personally Reviewed:    Consultant(s) Notes Reviewed:      Care Discussed with Consultants/Other Providers:    Efraín Velasquez MD, Internal Medicine Resident

## 2023-09-21 NOTE — DISCHARGE NOTE PROVIDER - NSFOLLOWUPCLINICS_GEN_ALL_ED_FT
[facility]  Hematology/Oncology  [address] [id number]  Phone: [phone]  Fax:

## 2023-09-21 NOTE — PROGRESS NOTE ADULT - ATTENDING COMMENTS
88M w/ HTN, CAD s/p stent (2012), AF (apixaban) s/p PPM, HFrEF (EF 30-25%), hemorrhoids presenting with BRBPR x 1 day as well as ongoing fatigue, nocturia, and 30 lb unintentional weight loss over the past several months found to have CT chest/abdomen/pelvis with stercoral colitis, heterogenous liver, enlarged, heterogeneous prostate and diffuse osseous metastases, and elevated PSA concerning for metastatic prostate cancer.     #Stercoral colitis c/b BRBPR - continues to have BMs, no gross blood in stool, hgb stable; appreciate GI recommendations  #Likely metastatic prostate cancer - patient and wife struggling with whether pursuing further workup is within goals of care. palliative care consulted to help with goals of care conversation and decisions regarding whether or not to pursue further diagnostics --- after their conversation with palliative care patient and wife voiced that they would like to pursue a biopsy for tissue diagnosis. Discussed with IR who asked that we consult oncology for recommendations on guiding diagnostic workup.    Discussed with patient's wife at bedside  Discussed with palliative care attending

## 2023-09-21 NOTE — DISCHARGE NOTE PROVIDER - NSDCMRMEDTOKEN_GEN_ALL_CORE_FT
Albuterol (Eqv-ProAir HFA) 90 mcg/inh inhalation aerosol: 2 puff(s) inhaled every 4 hours as needed for  shortness of breath and/or wheezing  atorvastatin 20 mg oral tablet: 1 tab(s) orally once a day  carvedilol 3.125 mg oral tablet: 1 tab(s) orally 2 times a day  Eliquis 5 mg oral tablet: 1 tab(s) orally 2 times a day  losartan-hydrochlorothiazide 50 mg-12.5 mg oral tablet: 1 tab(s) orally once a day  sertraline 25 mg oral tablet: 1 tab(s) orally once a day

## 2023-09-21 NOTE — GOALS OF CARE CONVERSATION - ADVANCED CARE PLANNING - CONVERSATION DETAILS
Patient and family are aware of concerns of likely underlying malignancy with suspicion for prostate as primary. Family aware of concerns that cancer has metastasized to bones.    Wife shares they are still undecided on how to proceed with the next steps. Patient and Wife shares that their daughter had passed away from cancer despite DMT. Patient with questions about role of biopsy and family with questions about biopsy site. Reviewed role of diagnostic biopsy is for diagnosis and if interested in determining treatment options. Discussed IR likely would need to evaluate patient for specific biopsy site if they are interested in pursuing biopsy.  Also reviewed option of foregoing diagnostic workup/ treatment and transition to symptom focused care if not interested in pursuing DMT.   Patient and family remain undecided if they wish to pursue biopsy. Wife wishes for patient to make the decision but also has been speaking to their children for their opinion and support as well.  Emotional support provided

## 2023-09-21 NOTE — DISCHARGE NOTE PROVIDER - DETAILS OF MALNUTRITION DIAGNOSIS/DIAGNOSES
This patient has been assessed with a concern for Malnutrition and was treated during this hospitalization for the following Nutrition diagnosis/diagnoses:     -  09/19/2023: Severe protein-calorie malnutrition

## 2023-09-21 NOTE — CONSULT NOTE ADULT - PROBLEM SELECTOR RECOMMENDATION 4
Patient and family are aware of concerns of likely underlying malignancy. They are unsure as how they wish to proceed with the next steps. Reviewed role of diagnostic biopsy for diagnosis and if interested in determining treatment options; also reviewed option of foregoing diagnostic workup/ treatment and transition to symptom focused care.   Patient and family undecided at this time.   Please see separate GOC note.  16 minutes spent on goals of care

## 2023-09-21 NOTE — CONSULT NOTE ADULT - ASSESSMENT
88M w/ HTN, CAD s/p stent (2012), AF (apixaban) s/p PPM, HFrEF (EF 30-25%), hemorrhoids presenting with BRBPR x 1 day as well as ongoing fatigue, nocturia, and 30 lb unintentional weight loss over the past several months found to have CT chest/abdomen/pelvis with stercoral colitis, heterogenous liver, enlarged, heterogeneous prostate and diffuse osseous metastases, and elevated PSA concerning for metastatic prostate cancer.   Palliative Care consulted for complex decision making  related to goals of care discussions

## 2023-09-21 NOTE — DISCHARGE NOTE PROVIDER - NS AS DC PROVIDER CONTACT Y/N MULTI
Return to the ER immediately for severe or worsening symptoms or for the development of any new worrisome symptoms including but not limited to persistent difficulty breathing, fever greater than 100.3 degrees for greater than 48 hours, chest pain or inability to tolerate fluids by mouth.  
Yes

## 2023-09-21 NOTE — DISCHARGE NOTE PROVIDER - NSDCFUSCHEDAPPT_GEN_ALL_CORE_FT
[facility] Physician [facility]  HEARTVASC [address]  Scheduled Appointment: 09/27/2023    [facility]  HEARTVASC [address]  Scheduled Appointment: 11/16/2023    Jairo Guerrero  [facility]  HEARTVASC [address]  Scheduled Appointment: 11/30/2023    [facility]  HEARTVASC [address]  Scheduled Appointment: 11/30/2023    [facility]  HEARTVASC [address]  Scheduled Appointment: 11/30/2023

## 2023-09-21 NOTE — CONSULT NOTE ADULT - PROBLEM SELECTOR RECOMMENDATION 2
- Patient likely with metastatic cancer based on imaging thus far   -CT Abd/ Pelvis 9/18 - Heterogeneous, enlarged prostate. Markedly enlarged prostate gland. Correlate for underlying prostatic pathology.  Heterogeneous appearance of the liver. There is a right hepatic lobe hemangioma.   Diffuse osseous metastasis.  - Patient and family still discussing if they wish to pursue diagnostic workup

## 2023-09-21 NOTE — CONSULT NOTE ADULT - PROBLEM SELECTOR RECOMMENDATION 5
Agree with PO Oxycodone 2.5mg -5mg PRN moderate/severe pain, respectively due to diffuse bone mets    Case reviewed with primary team.   Thank you for allowing us to participate in your patient's care. Please page [phone] for any questions/concerns.

## 2023-09-21 NOTE — DISCHARGE NOTE PROVIDER - NSDCFUADDAPPT_GEN_ALL_CORE_FT
Please follow up with the [facility] after discharge to discuss your biopsy results.

## 2023-09-21 NOTE — DISCHARGE NOTE PROVIDER - NSDCCPCAREPLAN_GEN_ALL_CORE_FT
PRINCIPAL DISCHARGE DIAGNOSIS  Diagnosis: Metastasis of unknown origin  Assessment and Plan of Treatment: You came into the hospital with a history of weight loss, and were found to have lesions in your bones as well as an abnormal PSA concerning for metastatic prostate cancer. You had a bone biopsy with our interventional radiology team on 9/25. When you leave the hospital, please follow up with your PCP and the [facility] for further managment.      SECONDARY DISCHARGE DIAGNOSES  Diagnosis: Stercoral colitis  Assessment and Plan of Treatment: While you were in the hospital you were found to have inflammation in your colon as a result of constipation. When you leave the hospital please take miralax as needed to have regular bowel movements.    Diagnosis: Bleeding per rectum  Assessment and Plan of Treatment: When you came into the hospital you noted some blood in your bowel movements. We monitored your blood counts throughout your hospitalization and they remained stable. When you leave the hospital please follow up with your PCP, and if you notice additional bleeding from your rectum please return to the ED for treatment.

## 2023-09-22 NOTE — PROGRESS NOTE ADULT - ATTENDING COMMENTS
88M w/ HTN, CAD s/p stent (2012), AF (apixaban) s/p PPM, HFrEF (EF 30-25%), hemorrhoids presenting with BRBPR x 1 day as well as ongoing fatigue, nocturia, and 30 lb unintentional weight loss over the past several months found to have CT chest/abdomen/pelvis with stercoral colitis, heterogenous liver, enlarged, heterogeneous prostate and diffuse osseous metastases, and elevated PSA concerning for metastatic prostate cancer.     #Stercoral colitis c/b BRBPR - continues to have BMs, no gross blood in stool, hgb stable; appreciate GI recommendations  #Likely metastatic prostate cancer - patient and family now voicing that they would like to proceed with biopsy. oncology consult today. IR consulted. Planning for possible bx on Monday. Appreciate palliative care recommendations.     Discussed with patient's wife at bedside

## 2023-09-22 NOTE — PROGRESS NOTE ADULT - PROBLEM SELECTOR PLAN 7
Diet: Clear liquid, advance as tolerated  DVT ppx: Holding eliquis for LGIB; SCDs for now.  Ethics: Full code; GOC discussions ongoing with patient and spouse Diet: Soft and bite sized, kosher  DVT ppx: Lovenox + SCDs  Ethics: Full code; GOC discussions ongoing with patient and spouse

## 2023-09-22 NOTE — PROGRESS NOTE ADULT - PROBLEM SELECTOR PLAN 2
Osseous mets found on CT A/P with enlarged prostate as suspected source. Notes 20-30 lb weight loss over past 2 months.  - F/u PSA - 345  - GOC discussion initiated with family. Patient and spouse will decide together on decision to undergo further workup/treatment. Osseous mets found on CT A/P with enlarged prostate as suspected source. Notes 20-30 lb weight loss over past 2 months.  - F/u PSA - 345  - GOC discussion initiated with family; patient and spouse would like to pursue biopsy at this time.

## 2023-09-22 NOTE — CONSULT NOTE ADULT - ASSESSMENT
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   Admits to 20-30 pounds weight loss in 2-3 months. Oncology consulted for suspected metastatic disease with bone lesions.     9/18/23 CT chest with IV contrast and abd/p angio with IV contrast showing No CT evidence of active GI bleeding at the time scanning. Distention of the rectum with stool with associated rectal wall thickening and inflammatory changes, consistent with stercoral colitis.  Heterogeneous, markedly enlarged prostate. Correlate with PSA. MRI prostate may be performed for further evaluation. Thick-walled appearance of the urinary bladder, likely related to its underdistended nature or sequelae of chronic outlet obstruction from prostate enlargement. Cystitis would appear similar.  Heterogeneous appearance of the liver with uncertain etiology. There is a right hepatic lobe hemangioma. MRI may be helpful for further delineation. Diffuse osseous metastasis (Diffuse sclerotic bone lesions involving the   spine, ribs, pelvic bones, and right femur).  .    lab wbc 11.98 Hb 10.3 plt 273; Alp 398 Cr/LFTs wnl . urinalysis positive for blood.       #Metastatic disease with bone lesions   -; Marked enlarged prostate with Diffuse sclerotic bone lesions involving the spine, ribs, pelvic bones, and right femur.  -IR biopsy for bone lesions if family and pt decide to pursue further evaluations and treatments  -PMSA PET/CT scan can be performed as outpt if biopsy pathology confirms as prostate cancer   -GOC, pain management, supportive care per primary team and palliative care team if necessary   -will refer to [facility] if biopsy is performed. [facility] cancer care team will be notified once biopsy is done so that team will f/u pathology result and arrange the appointment at [facility].   -GI ppx and DVT ppx, the rest of care per primary team   -anemia w/u with iron studies and ferritin suggesting mixed picture of WOODY and AoCD. check B12, folate level, LDH, Uric acid, haptoglobin (although the possibility of hemolytic anemia is low based on normal Tbil)      note is not finalized until signed by attending   Colton Rodgers PGY6   Hem & Onc fellow [phone]

## 2023-09-22 NOTE — CONSULT NOTE ADULT - ATTENDING COMMENTS
89 y/o M with multiple comorbidities initially presented with BRBPR and found to have multiple sclerotic bone lesions concerning for malignancy. PSA elevated >300, enlarged prostate on scans. Explained to patient and wife at bedside to confirm diagnosis would need tissue biopsy and they agree to IR guided bone biopsy at this time. Will f/u once path results.

## 2023-09-22 NOTE — PROGRESS NOTE ADULT - ASSESSMENT
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 CT on admission revealed diffuse osseous mets, with an enlarged prostate as the potential source. Pending C discussion with patient and spouse to decide treatment vs comfort care. 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 CT on admission revealed diffuse osseous mets, with an enlarged prostate as the potential source. Pending [facility] discussion with patient and spouse to decide treatment vs comfort care; at this time they would like to pursue biopsy, potentially planned for Monday

## 2023-09-22 NOTE — PROGRESS NOTE ADULT - SUBJECTIVE AND OBJECTIVE BOX
Patient is a 88y old  Male who presents with a chief complaint of LGIB (21 Sep 2023 17:52)      SUBJECTIVE / OVERNIGHT EVENTS: Patient seen and examined at bedside.    MEDICATIONS  (STANDING):  albuterol/ipratropium for Nebulization 3 milliLiter(s) Nebulizer every 6 hours  atorvastatin 20 milliGRAM(s) Oral at bedtime  guaiFENesin Oral Liquid (Sugar-Free) 200 milliGRAM(s) Oral every 6 hours  influenza  Vaccine (HIGH DOSE) 0.7 milliLiter(s) IntraMuscular once  melatonin 3 milliGRAM(s) Oral at bedtime  polyethylene glycol 3350 17 Gram(s) Oral two times a day  senna 2 Tablet(s) Oral daily  sertraline 25 milliGRAM(s) Oral daily    MEDICATIONS  (PRN):  acetaminophen     Tablet .. 650 milliGRAM(s) Oral every 6 hours PRN Mild Pain (1 - 3)  oxyCODONE    IR 5 milliGRAM(s) Oral every 6 hours PRN Severe Pain (7 - 10)  oxyCODONE    IR 2.5 milliGRAM(s) Oral every 6 hours PRN Moderate Pain (4 - 6)      Vital Signs Last 24 Hrs  T(C): 36.6 (22 Sep 2023 05:28), Max: 36.6 (22 Sep 2023 05:28)  T(F): 97.9 (22 Sep 2023 05:28), Max: 97.9 (22 Sep 2023 05:28)  HR: 66 (22 Sep 2023 05:28) (60 - 66)  BP: 114/62 (22 Sep 2023 05:28) (114/62 - 122/68)  BP(mean): --  RR: 17 (22 Sep 2023 05:28) (17 - 17)  SpO2: 99% (22 Sep 2023 05:28) (98% - 99%)    Parameters below as of 22 Sep 2023 05:28  Patient On (Oxygen Delivery Method): room air      CAPILLARY BLOOD GLUCOSE        I&O's Summary    21 Sep 2023 07:01  -  22 Sep 2023 07:00  --------------------------------------------------------  IN: 0 mL / OUT: 100 mL / NET: -100 mL        PHYSICAL EXAM:  GENERAL: NAD, well-developed  HEAD:  Atraumatic, Normocephalic  EYES: EOMI, PERRLA, conjunctiva and sclera clear  NECK: Supple, No JVD  CHEST/LUNG: Clear to auscultation bilaterally; No wheeze  HEART: Regular rate and rhythm; No murmurs, rubs, or gallops  ABDOMEN: Soft, Nontender, Nondistended; Bowel sounds present  EXTREMITIES:  2+ Peripheral Pulses, No clubbing, cyanosis, or edema  PSYCH: AAOx3  NEUROLOGY: non-focal  SKIN: No rashes or lesions    LABS:                        10.5   10.77 )-----------( 258      ( 21 Sep 2023 07:10 )             32.3     09-21    135  |  99  |  20  ----------------------------<  95  4.7   |  28  |  0.75    Ca    8.1<L>      21 Sep 2023 07:10  Phos  3.3     09-21  Mg     2.10     09-21    TPro  5.6<L>  /  Alb  3.0<L>  /  TBili  0.4  /  DBili  x   /  AST  13  /  ALT  9   /  AlkPhos  402<H>  09-21    Urinalysis Basic - ( 21 Sep 2023 07:10 )    Color: x / Appearance: x / SG: x / pH: x  Gluc: 95 mg/dL / Ketone: x  / Bili: x / Urobili: x   Blood: x / Protein: x / Nitrite: x   Leuk Esterase: x / RBC: x / WBC x   Sq Epi: x / Non Sq Epi: x / Bacteria: x    RADIOLOGY & ADDITIONAL TESTS:    Imaging Personally Reviewed:    Consultant(s) Notes Reviewed:      Care Discussed with Consultants/Other Providers:    Efraín Velasquez MD, Internal Medicine Resident Patient is a 88y old  Male who presents with a chief complaint of LGIB (21 Sep 2023 17:52)    SUBJECTIVE / OVERNIGHT EVENTS: Patient seen and examined at bedside. GOC revisted today; patient would like to proceed with biopsy at this time.    MEDICATIONS  (STANDING):  albuterol/ipratropium for Nebulization 3 milliLiter(s) Nebulizer every 6 hours  atorvastatin 20 milliGRAM(s) Oral at bedtime  guaiFENesin Oral Liquid (Sugar-Free) 200 milliGRAM(s) Oral every 6 hours  influenza  Vaccine (HIGH DOSE) 0.7 milliLiter(s) IntraMuscular once  melatonin 3 milliGRAM(s) Oral at bedtime  polyethylene glycol 3350 17 Gram(s) Oral two times a day  senna 2 Tablet(s) Oral daily  sertraline 25 milliGRAM(s) Oral daily    MEDICATIONS  (PRN):  acetaminophen     Tablet .. 650 milliGRAM(s) Oral every 6 hours PRN Mild Pain (1 - 3)  oxyCODONE    IR 5 milliGRAM(s) Oral every 6 hours PRN Severe Pain (7 - 10)  oxyCODONE    IR 2.5 milliGRAM(s) Oral every 6 hours PRN Moderate Pain (4 - 6)    Vital Signs Last 24 Hrs  T(C): 36.6 (22 Sep 2023 05:28), Max: 36.6 (22 Sep 2023 05:28)  T(F): 97.9 (22 Sep 2023 05:28), Max: 97.9 (22 Sep 2023 05:28)  HR: 66 (22 Sep 2023 05:28) (60 - 66)  BP: 114/62 (22 Sep 2023 05:28) (114/62 - 122/68)  BP(mean): --  RR: 17 (22 Sep 2023 05:28) (17 - 17)  SpO2: 99% (22 Sep 2023 05:28) (98% - 99%)    Parameters below as of 22 Sep 2023 05:28  Patient On (Oxygen Delivery Method): room air    CAPILLARY BLOOD GLUCOSE    I&O's Summary    21 Sep 2023 07:01  -  22 Sep 2023 07:00  --------------------------------------------------------  IN: 0 mL / OUT: 100 mL / NET: -100 mL    PHYSICAL EXAM:  GENERAL: NAD, lying in bed comfortably  HEAD:  Atraumatic, Normocephalic  EYES: EOMI, PERRLA, conjunctiva and sclera clear  ENT: Moist mucous membranes  NECK: Supple, No JVD  CHEST/LUNG: Clear to auscultation bilaterally; No rales, rhonchi, wheezing, or rubs. Unlabored respirations  HEART: Regular rate and rhythm; No murmurs, rubs, or gallops  ABDOMEN: BSx4; Soft, nontender, nondistended  EXTREMITIES:  2+ Peripheral Pulses, brisk capillary refill. No clubbing, cyanosis, or edema  NERVOUS SYSTEM:  A&Ox3, no focal deficits   SKIN: No rashes or lesions    LABS:                        10.5   10.77 )-----------( 258      ( 21 Sep 2023 07:10 )             32.3     09-21    135  |  99  |  20  ----------------------------<  95  4.7   |  28  |  0.75    Ca    8.1<L>      21 Sep 2023 07:10  Phos  3.3     09-21  Mg     2.10     09-21    TPro  5.6<L>  /  Alb  3.0<L>  /  TBili  0.4  /  DBili  x   /  AST  13  /  ALT  9   /  AlkPhos  402<H>  09-21    Urinalysis Basic - ( 21 Sep 2023 07:10 )    Color: x / Appearance: x / SG: x / pH: x  Gluc: 95 mg/dL / Ketone: x  / Bili: x / Urobili: x   Blood: x / Protein: x / Nitrite: x   Leuk Esterase: x / RBC: x / WBC x   Sq Epi: x / Non Sq Epi: x / Bacteria: x    RADIOLOGY & ADDITIONAL TESTS:    Imaging Personally Reviewed:    Consultant(s) Notes Reviewed:      Care Discussed with Consultants/Other Providers:    Efraín Velasquez MD, Internal Medicine Resident

## 2023-09-22 NOTE — CONSULT NOTE ADULT - SUBJECTIVE AND OBJECTIVE BOX
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  Patient reports that he noted dark red blood in stool during bowel movements on 9/17 and 9/18, soaking multiple towels. As per wife, patient had one episode of bleeding on bedsheet 2 months ago after taking one pill of Advil. Admits to rectal pain and urinary frequency during this time and currently as well. Notes that he has had a cough since a dental procedure in July 2023. Admits to 20-30 pounds weight loss since then. Denies any fever, chills, n/v/d, abdominal pain, sob, palpitation, or any other complaints.    9/18/23 CT chest with IV contrast and abd/p angio with IV contrast showing No CT evidence of active GI bleeding at the time scanning. Distention of the rectum with stool with associated rectal wall thickening and inflammatory changes, consistent with stercoral colitis.  Heterogeneous, markedly enlarged prostate. Correlate with PSA. MRI prostate may be performed for further evaluation. Thick-walled appearance of the urinary bladder, likely related to its underdistended nature or sequelae of chronic outlet obstruction from prostate enlargement. Cystitis would appear similar.  Heterogeneous appearance of the liver with uncertain etiology. There is a right hepatic lobe hemangioma. MRI may be helpful for further delineation. Diffuse osseous metastasis.    lab wbc 11.98 Hb 10.3 plt 273; Alp 398 Cr/LFTs wnl . urinalysis positive for blood.           PAST MEDICAL & SURGICAL HISTORY:  Complete atrioventricular block  Complete heart block      Atherosclerosis of coronary artery  CAD (coronary artery disease)      Hyperlipidemia  HLD (hyperlipidemia)      Essential hypertension  HTN (hypertension)      History of total knee replacement  S/P knee replacement, bilateral          Allergies    No Known Allergies    Intolerances        MEDICATIONS  (STANDING):  albuterol/ipratropium for Nebulization 3 milliLiter(s) Nebulizer every 6 hours  atorvastatin 20 milliGRAM(s) Oral at bedtime  guaiFENesin Oral Liquid (Sugar-Free) 200 milliGRAM(s) Oral every 6 hours  influenza  Vaccine (HIGH DOSE) 0.7 milliLiter(s) IntraMuscular once  melatonin 3 milliGRAM(s) Oral at bedtime  polyethylene glycol 3350 17 Gram(s) Oral two times a day  senna 2 Tablet(s) Oral daily  sertraline 25 milliGRAM(s) Oral daily    MEDICATIONS  (PRN):  acetaminophen     Tablet .. 650 milliGRAM(s) Oral every 6 hours PRN Mild Pain (1 - 3)  oxyCODONE    IR 2.5 milliGRAM(s) Oral every 6 hours PRN Moderate Pain (4 - 6)  oxyCODONE    IR 5 milliGRAM(s) Oral every 6 hours PRN Severe Pain (7 - 10)      FAMILY HISTORY:  Family history of ischemic heart disease  Family history of myocardial infarction        SOCIAL HISTORY: No EtOH, no tobacco    REVIEW OF SYSTEMS:    See HPI         T(F): 97.9 (09-22-23 @ 05:28), Max: 97.9 (09-22-23 @ 05:28)  HR: 66 (09-22-23 @ 05:28)  BP: 114/62 (09-22-23 @ 05:28)  RR: 17 (09-22-23 @ 05:28)  SpO2: 99% (09-22-23 @ 05:28)  Wt(kg): --    GENERAL: NAD, well-developed  HEAD:  Atraumatic, Normocephalic  EYES: EOMI,  conjunctiva and sclera clear  NECK: Supple, No JVD  CHEST/LUNG: Clear to auscultation bilaterally; No wheeze  HEART: irregular irregular rate and rhythm; No murmurs,   ABDOMEN: Soft, Nontender, Nondistended; Bowel sounds present  EXTREMITIES:  2+ Peripheral Pulses, No clubbing, cyanosis, or edema  NEUROLOGY: non-focal  SKIN: No rashes or lesions                          10.3   11.98 )-----------( 273      ( 22 Sep 2023 07:06 )             31.5       09-22    134<L>  |  99  |  20  ----------------------------<  107<H>  3.7   |  25  |  0.67    Ca    8.1<L>      22 Sep 2023 07:06  Phos  3.3     09-22  Mg     2.00     09-22    TPro  5.4<L>  /  Alb  2.7<L>  /  TBili  0.4  /  DBili  x   /  AST  11  /  ALT  7   /  AlkPhos  398<H>  09-22      Phosphorus: 3.3 mg/dL (09-22 @ 07:06)  Magnesium: 2.00 mg/dL (09-22 @ 07:06)          Clean Catch Clean Catch (Midstream)  09-18 @ 08:58   >=3 organisms. Probable collection contamination.  --  --

## 2023-09-22 NOTE — PROGRESS NOTE ADULT - PROBLEM SELECTOR PLAN 1
Patient with bright red blood per rectum for 1 day. One potential prior episode of bleeding after found blood on bedsheet ~2 months ago. Takes eliquis at home for a-fib. Baseline hemoglobin 11-12; 10.6 on admission.  - Hgb this AM of 10.2 (9/18)  - Hold eliquis in the setting of LGIB.  - GI consulted; recs appreciated. No colonoscopy at this time.  - Smog enema ordered, bowel regimen with miralax and senna.  - PPI given in ED as unclear source for GI bleed.  - Maintain active type and screen, transfuse if hgb <7. Maintain 2 large bore IVs.  - Trend CBC q12h  - Initially NPO; as h/h stable, advance diet as tolerated. Patient with bright red blood per rectum for 1 day. One potential prior episode of bleeding after found blood on bedsheet ~2 months ago. Takes eliquis at home for a-fib. Baseline hemoglobin 11-12; 10.6 on admission.  - Hgb this AM of 10.3 (9/22); stable since admission.  - GI consulted; recs appreciated. No colonoscopy at this time.  - Smog enema ordered, bowel regimen with miralax and senna.  - Maintain active type and screen, transfuse if hgb <7. Maintain 2 large bore IVs.  - Trend CBC q12h  - Anemia work up with B12, folate, haptoglobin, LDH, uric acid

## 2023-09-23 NOTE — CHART NOTE - NSCHARTNOTEFT_GEN_A_CORE
IR Follow-Up     chart reviewed; heme/onc & family on board with continued work-up and treatment.  will plan for CT-guided bone biopsy on monday 9/25.  NPO at midnight sunday 9/24.    --  Steve Garvey DO/LUTHER  Interventional Radiology Resident (PGY-5)  Available on [employer] TEAMS    For EMERGENT inquiries/questions:  IR Pager ([facility]): [phone]  IR Pager ([facility]): [phone] ; [phone]    For non-emergent consults/questions:   Please place a sunrise order "Consult- Interventional Radiology" with an appropriate callback number    For questions about scheduling during appropriate work hours, call IR :  [facility]: [phone]  [facility]: [phone]    For outpatient IR booking:  [facility]: [phone]  [facility]: [phone]

## 2023-09-23 NOTE — PROGRESS NOTE ADULT - PROBLEM SELECTOR PLAN 1
Patient with bright red blood per rectum for 1 day. One potential prior episode of bleeding after found blood on bedsheet ~2 months ago. Takes eliquis at home for a-fib. Baseline hemoglobin 11-12; 10.6 on admission.  - Hgb this AM of 10.3 (9/22); stable since admission.  - GI consulted; recs appreciated. No colonoscopy at this time.  - Smog enema ordered, bowel regimen with miralax and senna.  - Maintain active type and screen, transfuse if hgb <7. Maintain 2 large bore IVs.  - Trend CBC q12h  - Anemia work up with B12, folate, haptoglobin, LDH, uric acid - unremarkable.

## 2023-09-23 NOTE — PROGRESS NOTE ADULT - ASSESSMENT
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 CT on admission revealed diffuse osseous mets, with an enlarged prostate as the potential source. Pending C discussion with patient and spouse to decide treatment vs comfort care; at this time they would like to pursue biopsy, potentially planned for Monday with IR.

## 2023-09-23 NOTE — PROGRESS NOTE ADULT - ATTENDING COMMENTS
88M w/ HTN, CAD s/p stent (2012), AF (apixaban) s/p PPM, HFrEF (EF 30-25%), hemorrhoids presenting with BRBPR x 1 day as well as ongoing fatigue, nocturia, and 30 lb unintentional weight loss over the past several months found to have CT chest/abdomen/pelvis with stercoral colitis, heterogenous liver, enlarged, heterogeneous prostate and diffuse osseous metastases, and elevated PSA concerning for metastatic prostate cancer.     #Likely metastatic prostate cancer - patient and family now voicing that they would like to proceed with biopsy. oncology consult today. IR consulted. Planning for possible bx on Monday. Appreciate palliative care recommendations.   #Stercoral colitis c/b BRBPR - continues to have BMs, no gross blood in stool, hgb stable; appreciate GI recommendations    Discussed with patient's wife at bedside bot agreeable for procedure early next week.

## 2023-09-23 NOTE — PROGRESS NOTE ADULT - PROBLEM SELECTOR PLAN 7
Diet: Soft and bite sized, kosher  DVT ppx: Lovenox + SCDs  Ethics: Full code; GOC discussions ongoing with patient and spouse

## 2023-09-23 NOTE — PROGRESS NOTE ADULT - SUBJECTIVE AND OBJECTIVE BOX
Patient is a 88y old  Male who presents with a chief complaint of LGIB (22 Sep 2023 09:36)    SUBJECTIVE / OVERNIGHT EVENTS: Patient seen and examined at bedside. No overnight events.    MEDICATIONS  (STANDING):  albuterol/ipratropium for Nebulization 3 milliLiter(s) Nebulizer every 6 hours  atorvastatin 20 milliGRAM(s) Oral at bedtime  enoxaparin Injectable 40 milliGRAM(s) SubCutaneous every 24 hours  guaiFENesin Oral Liquid (Sugar-Free) 200 milliGRAM(s) Oral every 6 hours  influenza  Vaccine (HIGH DOSE) 0.7 milliLiter(s) IntraMuscular once  melatonin 3 milliGRAM(s) Oral at bedtime  polyethylene glycol 3350 17 Gram(s) Oral two times a day  senna 2 Tablet(s) Oral daily  sertraline 25 milliGRAM(s) Oral daily    MEDICATIONS  (PRN):  acetaminophen     Tablet .. 650 milliGRAM(s) Oral every 6 hours PRN Mild Pain (1 - 3)  oxyCODONE    IR 5 milliGRAM(s) Oral every 6 hours PRN Severe Pain (7 - 10)  oxyCODONE    IR 2.5 milliGRAM(s) Oral every 6 hours PRN Moderate Pain (4 - 6)    Vital Signs Last 24 Hrs  T(C): 36.9 (22 Sep 2023 21:07), Max: 36.9 (22 Sep 2023 21:07)  T(F): 98.4 (22 Sep 2023 21:07), Max: 98.4 (22 Sep 2023 21:07)  HR: 60 (22 Sep 2023 21:52) (60 - 66)  BP: 121/67 (22 Sep 2023 21:07) (117/60 - 121/67)  BP(mean): --  RR: 18 (22 Sep 2023 21:07) (18 - 18)  SpO2: 97% (22 Sep 2023 21:52) (97% - 97%)    Parameters below as of 22 Sep 2023 21:52  Patient On (Oxygen Delivery Method): room air    CAPILLARY BLOOD GLUCOSE    I&O's Summary    PHYSICAL EXAM:  GENERAL: NAD, lying in bed comfortably  HEAD:  Atraumatic, Normocephalic  EYES: EOMI, PERRLA, conjunctiva and sclera clear  ENT: Moist mucous membranes  NECK: Supple, No JVD  CHEST/LUNG: Clear to auscultation bilaterally; No rales, rhonchi, wheezing, or rubs. Unlabored respirations  HEART: Regular rate and rhythm; No murmurs, rubs, or gallops  ABDOMEN: BSx4; Soft, nontender, nondistended  EXTREMITIES:  2+ Peripheral Pulses, brisk capillary refill. No clubbing, cyanosis, or edema  NERVOUS SYSTEM:  A&Ox3, no focal deficits   SKIN: No rashes or lesions    LABS:                        10.3   11.98 )-----------( 273      ( 22 Sep 2023 07:06 )             31.5     09-22    134<L>  |  99  |  20  ----------------------------<  107<H>  3.7   |  25  |  0.67    Ca    8.1<L>      22 Sep 2023 07:06  Phos  3.3     09-22  Mg     2.00     09-22    TPro  5.4<L>  /  Alb  2.7<L>  /  TBili  0.4  /  DBili  x   /  AST  11  /  ALT  7   /  AlkPhos  398<H>  09-22    Urinalysis Basic - ( 22 Sep 2023 07:06 )    Color: x / Appearance: x / SG: x / pH: x  Gluc: 107 mg/dL / Ketone: x  / Bili: x / Urobili: x   Blood: x / Protein: x / Nitrite: x   Leuk Esterase: x / RBC: x / WBC x   Sq Epi: x / Non Sq Epi: x / Bacteria: x    RADIOLOGY & ADDITIONAL TESTS:    Imaging Personally Reviewed:    Consultant(s) Notes Reviewed:      Care Discussed with Consultants/Other Providers:    Efraín Velasquez MD, Internal Medicine Resident Patient is a 88y old  Male who presents with a chief complaint of LGIB (22 Sep 2023 09:36)    SUBJECTIVE / OVERNIGHT EVENTS: Patient seen and examined at bedside. No overnight events. Patient still would like to proceed with biopsy; tentatively planned for Monday. Still having frequent BM; merary d/c'ed this AM.    MEDICATIONS  (STANDING):  albuterol/ipratropium for Nebulization 3 milliLiter(s) Nebulizer every 6 hours  atorvastatin 20 milliGRAM(s) Oral at bedtime  enoxaparin Injectable 40 milliGRAM(s) SubCutaneous every 24 hours  guaiFENesin Oral Liquid (Sugar-Free) 200 milliGRAM(s) Oral every 6 hours  influenza  Vaccine (HIGH DOSE) 0.7 milliLiter(s) IntraMuscular once  melatonin 3 milliGRAM(s) Oral at bedtime  polyethylene glycol 3350 17 Gram(s) Oral two times a day  senna 2 Tablet(s) Oral daily  sertraline 25 milliGRAM(s) Oral daily    MEDICATIONS  (PRN):  acetaminophen     Tablet .. 650 milliGRAM(s) Oral every 6 hours PRN Mild Pain (1 - 3)  oxyCODONE    IR 5 milliGRAM(s) Oral every 6 hours PRN Severe Pain (7 - 10)  oxyCODONE    IR 2.5 milliGRAM(s) Oral every 6 hours PRN Moderate Pain (4 - 6)    Vital Signs Last 24 Hrs  T(C): 36.9 (22 Sep 2023 21:07), Max: 36.9 (22 Sep 2023 21:07)  T(F): 98.4 (22 Sep 2023 21:07), Max: 98.4 (22 Sep 2023 21:07)  HR: 60 (22 Sep 2023 21:52) (60 - 66)  BP: 121/67 (22 Sep 2023 21:07) (117/60 - 121/67)  BP(mean): --  RR: 18 (22 Sep 2023 21:07) (18 - 18)  SpO2: 97% (22 Sep 2023 21:52) (97% - 97%)    Parameters below as of 22 Sep 2023 21:52  Patient On (Oxygen Delivery Method): room air    CAPILLARY BLOOD GLUCOSE    I&O's Summary    PHYSICAL EXAM:  GENERAL: NAD, lying in bed comfortably  HEAD:  Atraumatic, Normocephalic  EYES: EOMI, PERRLA, conjunctiva and sclera clear  ENT: Moist mucous membranes  NECK: Supple, No JVD  CHEST/LUNG: Clear to auscultation bilaterally; No rales, rhonchi, wheezing, or rubs. Unlabored respirations  HEART: Regular rate and rhythm; No murmurs, rubs, or gallops  ABDOMEN: BSx4; Soft, nontender, nondistended  EXTREMITIES:  2+ Peripheral Pulses, brisk capillary refill. No clubbing, cyanosis, or edema  NERVOUS SYSTEM:  A&Ox3, no focal deficits   SKIN: No rashes or lesions    LABS:                        10.3   11.98 )-----------( 273      ( 22 Sep 2023 07:06 )             31.5     09-22    134<L>  |  99  |  20  ----------------------------<  107<H>  3.7   |  25  |  0.67    Ca    8.1<L>      22 Sep 2023 07:06  Phos  3.3     09-22  Mg     2.00     09-22    TPro  5.4<L>  /  Alb  2.7<L>  /  TBili  0.4  /  DBili  x   /  AST  11  /  ALT  7   /  AlkPhos  398<H>  09-22    Urinalysis Basic - ( 22 Sep 2023 07:06 )    Color: x / Appearance: x / SG: x / pH: x  Gluc: 107 mg/dL / Ketone: x  / Bili: x / Urobili: x   Blood: x / Protein: x / Nitrite: x   Leuk Esterase: x / RBC: x / WBC x   Sq Epi: x / Non Sq Epi: x / Bacteria: x    RADIOLOGY & ADDITIONAL TESTS:    Imaging Personally Reviewed:    Consultant(s) Notes Reviewed:      Care Discussed with Consultants/Other Providers:    Efraín Velasquez MD, Internal Medicine Resident

## 2023-09-23 NOTE — CHART NOTE - NSCHARTNOTEFT_GEN_A_CORE
PRE-INTERVENTIONAL RADIOLOGY PROCEDURE NOTE      Patient Age: 88    Patient Gender: Male    Procedure: CT-guided bone biopsy    Diagnosis/Indication: Suspected metastatic prostate cancer    Interventional Radiology Attending Physician: Dr. Simone Esquivel    Ordering Attending Physician: Dr. Devan Torres    Pertinent Medical History: HTN, CAD S/P PCI stent in 2012, atrial fibrillation on Eliquis, high degree/complete AV block s/p PPM MDT implant (8/17/15), hemorrhoids    Pertinent labs:                      10.3   12.98 )-----------( 292      ( 23 Sep 2023 07:30 )             31.7       09-23    135  |  101  |  17  ----------------------------<  94  4.1   |  26  |  0.62    Ca    8.1<L>      23 Sep 2023 07:30  Phos  3.4     09-23  Mg     2.00     09-23    TPro  5.4<L>  /  Alb  2.7<L>  /  TBili  0.5  /  DBili  x   /  AST  13  /  ALT  7   /  AlkPhos  407<H>  09-23        Patient and Family Aware ? Yes

## 2023-09-23 NOTE — PROGRESS NOTE ADULT - PROBLEM SELECTOR PLAN 2
Osseous mets found on CT A/P with enlarged prostate as suspected source. Notes 20-30 lb weight loss over past 2 months.  - F/u PSA - 345  - [facility] discussion initiated with family; patient and spouse would like to pursue biopsy at this time.  - Biopsy planned for Monday per IR.

## 2023-09-24 NOTE — PROGRESS NOTE ADULT - SUBJECTIVE AND OBJECTIVE BOX
· Patient is s/p C4-C6 decompressive laminectomies, C3-T1 fusion on 9/9/21  · Upright x-rays completed in VISTA collar on 9/11  · Maintain VISTA collar and cervical spine precautions  · Multi modal analgesic regimen  · Continue neuro checks  Exam improving, continues to have exam findings c/w central cord syndrome, bilateral  and tricep strength weakness and decreased sensation in the C8 dermatome bilaterally  · Recommend MAPs above 85 >7 days  Patient is auto- mapping at this time  · DVT PPX: SCDs  Lovenox started on 9/11/21  · PENELOPE drain per neurosurgery, at full suction and to remain at this time  · Bowel regimen added  Patient is voiding  · PT/OT evaluation appreciated, recommending SCI rehab which patient and family are in agreement with  CM assisting with referrals  PM&R consult placed  · Patient will require outpatient follow-up with Neurosurgery in 2 weeks for postop check  Matias Dasilva  Internal Medicine PGY-3    PROGRESS NOTE:     Patient is a 88y old  Male who presents with a chief complaint of LGIB (23 Sep 2023 07:37)      SUBJECTIVE / OVERNIGHT EVENTS:    No acute events overnight. Patient examined at bedside with no acute complaints.     Pain:  Bowel Movements:  Urination:  OOB:  PT:    REVIEW OF SYSTEMS:    CONSTITUTIONAL: No weakness, fevers or chills  EYES/ENT: No visual changes;  No vertigo or throat pain   NECK: No pain or stiffness  RESPIRATORY: No cough, wheezing, hemoptysis; No shortness of breath  CARDIOVASCULAR: No chest pain or palpitations  GASTROINTESTINAL: No abdominal or epigastric pain. No nausea, vomiting, or hematemesis; No diarrhea or constipation. No melena or hematochezia.  GENITOURINARY: No dysuria, frequency or hematuria  NEUROLOGICAL: No numbness or weakness  SKIN: No itching, rashes      MEDICATIONS  (STANDING):  albuterol/ipratropium for Nebulization 3 milliLiter(s) Nebulizer every 6 hours  atorvastatin 20 milliGRAM(s) Oral at bedtime  guaiFENesin Oral Liquid (Sugar-Free) 200 milliGRAM(s) Oral every 6 hours  influenza  Vaccine (HIGH DOSE) 0.7 milliLiter(s) IntraMuscular once  melatonin 3 milliGRAM(s) Oral at bedtime  polyethylene glycol 3350 17 Gram(s) Oral two times a day  sertraline 25 milliGRAM(s) Oral daily    MEDICATIONS  (PRN):  acetaminophen     Tablet .. 650 milliGRAM(s) Oral every 6 hours PRN Mild Pain (1 - 3)  oxyCODONE    IR 2.5 milliGRAM(s) Oral every 6 hours PRN Moderate Pain (4 - 6)  oxyCODONE    IR 5 milliGRAM(s) Oral every 6 hours PRN Severe Pain (7 - 10)      CAPILLARY BLOOD GLUCOSE        I&O's Summary      VITALS:   T(C): 36.6 (09-24-23 @ 06:00), Max: 36.6 (09-23-23 @ 14:30)  HR: 77 (09-24-23 @ 06:00) (58 - 92)  BP: 126/60 (09-24-23 @ 06:00) (100/52 - 126/60)  RR: 18 (09-24-23 @ 06:00) (18 - 18)  SpO2: 98% (09-24-23 @ 06:00) (95% - 98%)    GENERAL: NAD, lying in bed comfortably  HEAD:  Atraumatic, normocephalic  EYES: EOMI, PERRLA, conjunctiva and sclera clear  ENT: Moist mucous membranes  NECK: Supple, no JVD  HEART: Regular rate and rhythm, no murmurs, rubs, or gallops  LUNGS: Unlabored respirations.  Clear to auscultation bilaterally, no crackles, wheezing, or rhonchi  ABDOMEN: Soft, nontender, nondistended, +BS  EXTREMITIES: 2+ peripheral pulses bilaterally. No clubbing, cyanosis, or edema  NERVOUS SYSTEM:  A&Ox3, no focal deficits   SKIN: No rashes or lesions    LABS:                        10.5   14.45 )-----------( 304      ( 23 Sep 2023 15:25 )             32.1     09-23    135  |  101  |  17  ----------------------------<  94  4.1   |  26  |  0.62    Ca    8.1<L>      23 Sep 2023 07:30  Phos  3.4     09-23  Mg     2.00     09-23    TPro  5.4<L>  /  Alb  2.7<L>  /  TBili  0.5  /  DBili  x   /  AST  13  /  ALT  7   /  AlkPhos  407<H>  09-23          Urinalysis Basic - ( 23 Sep 2023 07:30 )    Color: x / Appearance: x / SG: x / pH: x  Gluc: 94 mg/dL / Ketone: x  / Bili: x / Urobili: x   Blood: x / Protein: x / Nitrite: x   Leuk Esterase: x / RBC: x / WBC x   Sq Epi: x / Non Sq Epi: x / Bacteria: x          RADIOLOGY & ADDITIONAL TESTS:  Results Reviewed:   Imaging Personally Reviewed:  Electrocardiogram Personally Reviewed:    COORDINATION OF CARE:  Care Discussed with Consultants/Other Providers [Y/N]:  Prior or Outpatient Records Reviewed [Y/N]:   [facility]  Internal Medicine PGY-3    PROGRESS NOTE:     Patient is a 88y old  Male who presents with a chief complaint of LGIB (23 Sep 2023 07:37)      SUBJECTIVE / OVERNIGHT EVENTS:    No acute events overnight. Patient examined at bedside with no acute complaints.     REVIEW OF SYSTEMS:    CONSTITUTIONAL: No weakness, fevers or chills  EYES/ENT: No visual changes;  No vertigo or throat pain   NECK: No pain or stiffness  RESPIRATORY: No cough, wheezing, hemoptysis; No shortness of breath  CARDIOVASCULAR: No chest pain or palpitations  GASTROINTESTINAL: No abdominal or epigastric pain. No nausea, vomiting, or hematemesis; No diarrhea or constipation. No melena or hematochezia.  GENITOURINARY: No dysuria, frequency or hematuria  NEUROLOGICAL: No numbness or weakness  SKIN: No itching, rashes      MEDICATIONS  (STANDING):  albuterol/ipratropium for Nebulization 3 milliLiter(s) Nebulizer every 6 hours  atorvastatin 20 milliGRAM(s) Oral at bedtime  guaiFENesin Oral Liquid (Sugar-Free) 200 milliGRAM(s) Oral every 6 hours  influenza  Vaccine (HIGH DOSE) 0.7 milliLiter(s) IntraMuscular once  melatonin 3 milliGRAM(s) Oral at bedtime  polyethylene glycol 3350 17 Gram(s) Oral two times a day  sertraline 25 milliGRAM(s) Oral daily    MEDICATIONS  (PRN):  acetaminophen     Tablet .. 650 milliGRAM(s) Oral every 6 hours PRN Mild Pain (1 - 3)  oxyCODONE    IR 2.5 milliGRAM(s) Oral every 6 hours PRN Moderate Pain (4 - 6)  oxyCODONE    IR 5 milliGRAM(s) Oral every 6 hours PRN Severe Pain (7 - 10)      CAPILLARY BLOOD GLUCOSE        I&O's Summary      VITALS:   T(C): 36.6 (09-24-23 @ 06:00), Max: 36.6 (09-23-23 @ 14:30)  HR: 77 (09-24-23 @ 06:00) (58 - 92)  BP: 126/60 (09-24-23 @ 06:00) (100/52 - 126/60)  RR: 18 (09-24-23 @ 06:00) (18 - 18)  SpO2: 98% (09-24-23 @ 06:00) (95% - 98%)    GENERAL: NAD, lying in bed comfortably  HEAD:  Atraumatic, normocephalic  EYES: EOMI, PERRLA, conjunctiva and sclera clear  ENT: Moist mucous membranes  NECK: Supple, no JVD  HEART: Regular rate and rhythm, no murmurs, rubs, or gallops  LUNGS: Unlabored respirations.  Clear to auscultation bilaterally, no crackles, wheezing, or rhonchi  ABDOMEN: Soft, nontender, nondistended, +BS  EXTREMITIES: 2+ peripheral pulses bilaterally. No clubbing, cyanosis, or edema  NERVOUS SYSTEM:  A&Ox3, no focal deficits   SKIN: No rashes or lesions    LABS:                        10.5   14.45 )-----------( 304      ( 23 Sep 2023 15:25 )             32.1     09-23    135  |  101  |  17  ----------------------------<  94  4.1   |  26  |  0.62    Ca    8.1<L>      23 Sep 2023 07:30  Phos  3.4     09-23  Mg     2.00     09-23    TPro  5.4<L>  /  Alb  2.7<L>  /  TBili  0.5  /  DBili  x   /  AST  13  /  ALT  7   /  AlkPhos  407<H>  09-23          Urinalysis Basic - ( 23 Sep 2023 07:30 )    Color: x / Appearance: x / SG: x / pH: x  Gluc: 94 mg/dL / Ketone: x  / Bili: x / Urobili: x   Blood: x / Protein: x / Nitrite: x   Leuk Esterase: x / RBC: x / WBC x   Sq Epi: x / Non Sq Epi: x / Bacteria: x          RADIOLOGY & ADDITIONAL TESTS:  Results Reviewed:   Imaging Personally Reviewed:  Electrocardiogram Personally Reviewed:    COORDINATION OF CARE:  Care Discussed with Consultants/Other Providers [Y/N]:  Prior or Outpatient Records Reviewed [Y/N]:

## 2023-09-24 NOTE — PROGRESS NOTE ADULT - PROBLEM SELECTOR PLAN 2
Osseous mets found on CT A/P with enlarged prostate as suspected source. Notes 20-30 lb weight loss over past 2 months.  - F/u PSA - 345  - [facility] discussion initiated with family; patient and spouse would like to pursue biopsy at this time.  - Biopsy planned for Monday per IR. Osseous mets found on CT A/P with enlarged prostate as suspected source. Notes 20-30 lb weight loss over past 2 months.  - F/u PSA - 345  - [facility] discussion initiated with family; patient and spouse would like to pursue biopsy at this time.  - Biopsy planned for Monday 9/25 per IR.

## 2023-09-24 NOTE — PROGRESS NOTE ADULT - ASSESSMENT
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 CT on admission revealed diffuse osseous mets, with an enlarged prostate as the potential source. Pending C discussion with patient and spouse to decide treatment vs comfort care; at this time they would like to pursue biopsy, potentially planned for Monday with IR.

## 2023-09-24 NOTE — PROGRESS NOTE ADULT - ATTENDING COMMENTS
88M w/ HTN, CAD s/p stent (2012), AF (apixaban) s/p PPM, HFrEF (EF 30-25%), hemorrhoids presenting with BRBPR x 1 day as well as ongoing fatigue, nocturia, and 30 lb unintentional weight loss over the past several months found to have CT chest/abdomen/pelvis with stercoral colitis, heterogenous liver, enlarged, heterogeneous prostate and diffuse osseous metastases, and elevated PSA concerning for metastatic prostate cancer.     #Likely metastatic prostate cancer - PSA>300. Patient and family now voicing that they would like to proceed with biopsy. Oncology and IR consulted. Planning for possible bx on Monday. Appreciate palliative care recommendations.   #Stercoral colitis c/b BRBPR - continues to have BMs, no gross blood in stool, hgb stable; appreciate GI recommendations 88M w/ HTN, CAD s/p stent (2012), AF (apixaban) s/p PPM, HFrEF (EF 30-25%), hemorrhoids presenting with BRBPR x 1 day as well as ongoing fatigue, nocturia, and 30 lb unintentional weight loss over the past several months found to have CT chest/abdomen/pelvis with stercoral colitis, heterogenous liver, enlarged, heterogeneous prostate and diffuse osseous metastases, and elevated PSA concerning for metastatic prostate cancer.     #Likely metastatic prostate cancer - PSA highly elevated at 345. Patient and family now voicing that they would like to proceed with biopsy. Oncology and IR consulted. Planning for possible bx on Monday. Appreciate palliative care recommendations.   #Stercoral colitis c/b BRBPR - continues to have BMs, no gross blood in stool, hgb stable; appreciate GI recommendations

## 2023-09-25 NOTE — PROCEDURE NOTE - PROCEDURE FINDINGS AND DETAILS
Successful CT guided left iliac sclerotic bone lesion biopsy 18ga x1. Core given to cytopathology. Pt tolerated procedure well without immediate complication.

## 2023-09-25 NOTE — PROVIDER CONTACT NOTE (OTHER) - SITUATION
pt with  bp 93/56
Pt BP is 118/56 mmhg
Pts BP is 101/52 at this time
Pt had a small red streak BM this am
pt with  bp 101/54
pt with  bp 111/52
pt with  bp 93/48

## 2023-09-25 NOTE — PROGRESS NOTE ADULT - SUBJECTIVE AND OBJECTIVE BOX
Patient is a 88y old  Male who presents with a chief complaint of LGIB (24 Sep 2023 07:22)    SUBJECTIVE / OVERNIGHT EVENTS: Patient seen and examined at bedside. No acute events overnight. Patient planned for CT-guided bone biopsy today.    MEDICATIONS  (STANDING):  albuterol/ipratropium for Nebulization 3 milliLiter(s) Nebulizer every 6 hours  atorvastatin 20 milliGRAM(s) Oral at bedtime  guaiFENesin Oral Liquid (Sugar-Free) 200 milliGRAM(s) Oral every 6 hours  influenza  Vaccine (HIGH DOSE) 0.7 milliLiter(s) IntraMuscular once  melatonin 3 milliGRAM(s) Oral at bedtime  polyethylene glycol 3350 17 Gram(s) Oral two times a day  sertraline 25 milliGRAM(s) Oral daily    MEDICATIONS  (PRN):  acetaminophen     Tablet .. 650 milliGRAM(s) Oral every 6 hours PRN Mild Pain (1 - 3)  oxyCODONE    IR 2.5 milliGRAM(s) Oral every 6 hours PRN Moderate Pain (4 - 6)  oxyCODONE    IR 5 milliGRAM(s) Oral every 6 hours PRN Severe Pain (7 - 10)    Vital Signs Last 24 Hrs  T(C): 36.4 (25 Sep 2023 06:08), Max: 36.5 (24 Sep 2023 21:40)  T(F): 97.5 (25 Sep 2023 06:08), Max: 97.7 (24 Sep 2023 21:40)  HR: 78 (25 Sep 2023 06:08) (70 - 78)  BP: 122/64 (25 Sep 2023 06:08) (118/56 - 137/72)  BP(mean): --  RR: 18 (25 Sep 2023 06:08) (18 - 18)  SpO2: 100% (25 Sep 2023 06:08) (99% - 100%)    Parameters below as of 25 Sep 2023 06:08  Patient On (Oxygen Delivery Method): room air    CAPILLARY BLOOD GLUCOSE    I&O's Summary    PHYSICAL EXAM:  GENERAL: NAD, lying in bed comfortably  HEAD:  Atraumatic, Normocephalic  EYES: EOMI, PERRLA, conjunctiva and sclera clear  ENT: Moist mucous membranes  NECK: Supple, No JVD  CHEST/LUNG: Clear to auscultation bilaterally; No rales, rhonchi, wheezing, or rubs. Unlabored respirations  HEART: Regular rate and rhythm; No murmurs, rubs, or gallops  ABDOMEN: BSx4; Soft, nontender, nondistended  EXTREMITIES:  2+ Peripheral Pulses, brisk capillary refill. No clubbing, cyanosis, or edema  NERVOUS SYSTEM:  A&Ox3, no focal deficits   SKIN: No rashes or lesions    LABS:                        11.3   15.94 )-----------( 359      ( 25 Sep 2023 05:50 )             35.2     09-25    135  |  100  |  19  ----------------------------<  85  4.4   |  28  |  0.74    Ca    8.4      25 Sep 2023 05:50  Phos  3.2     09-25  Mg     2.00     09-25    TPro  6.0  /  Alb  3.1<L>  /  TBili  0.4  /  DBili  x   /  AST  17  /  ALT  11  /  AlkPhos  451<H>  09-25    PT/INR - ( 25 Sep 2023 05:50 )   PT: 11.9 sec;   INR: 1.05 ratio      PTT - ( 25 Sep 2023 05:50 )  PTT:30.5 sec    Urinalysis Basic - ( 25 Sep 2023 05:50 )    Color: x / Appearance: x / SG: x / pH: x  Gluc: 85 mg/dL / Ketone: x  / Bili: x / Urobili: x   Blood: x / Protein: x / Nitrite: x   Leuk Esterase: x / RBC: x / WBC x   Sq Epi: x / Non Sq Epi: x / Bacteria: x    RADIOLOGY & ADDITIONAL TESTS:    Imaging Personally Reviewed:    Consultant(s) Notes Reviewed:      Care Discussed with Consultants/Other Providers:    Efraín Velasquez MD, Internal Medicine Resident

## 2023-09-25 NOTE — PROGRESS NOTE ADULT - PROBLEM SELECTOR PROBLEM 6
Hyperlipidemia
Preventive measure
Hyperlipidemia
Hyperlipidemia

## 2023-09-25 NOTE — DISCHARGE NOTE NURSING/CASE MANAGEMENT/SOCIAL WORK - NSSCNAMETXT_GEN_ALL_CORE
[facility] at Home. Nurse to visit on the day following discharge. Other appropriate services to be arranged thereafter.   Please contact the home care agency at the above phone number if you have not heard from them by approximately 12 noon on the day after your hospital discharge.

## 2023-09-25 NOTE — PROGRESS NOTE ADULT - NUTRITIONAL ASSESSMENT
This patient has been assessed with a concern for Malnutrition and has been determined to have a diagnosis/diagnoses of Severe protein-calorie malnutrition.    This patient is being managed with:   Diet Soft and Bite Sized-  Kosher  Supplement Feeding Modality:  Oral  Ensure Plus High Protein Cans or Servings Per Day:  1       Frequency:  Three Times a day  Entered: Sep 19 2023  3:22PM  
This patient has been assessed with a concern for Malnutrition and has been determined to have a diagnosis/diagnoses of Severe protein-calorie malnutrition.    This patient is being managed with:   Diet NPO after Midnight-     NPO Start Date: 24-Sep-2023   NPO Start Time: 23:59  Entered: Sep 24 2023 10:54AM    Diet Soft and Bite Sized-  Kosher  Supplement Feeding Modality:  Oral  Ensure Plus High Protein Cans or Servings Per Day:  1       Frequency:  Three Times a day  Entered: Sep 19 2023  3:22PM

## 2023-09-25 NOTE — PROVIDER CONTACT NOTE (OTHER) - NAME OF MD/NP/PA/DO NOTIFIED:
Efraín Velasquez MD
MD Knapp,Kirk
Dr Knapp aware
Efraín quintanilla
MD Knapp,Kirk
MD Knapp,Kirk
Dr Knapp aware

## 2023-09-25 NOTE — PROGRESS NOTE ADULT - PROBLEM SELECTOR PROBLEM 2
Metastasis of unknown origin

## 2023-09-25 NOTE — PROGRESS NOTE ADULT - PROBLEM SELECTOR PLAN 6
Diet: Clear liquid, advance as tolerated  DVT ppx: Holding eliquis for LGIB; SCDs for now.  Ethics: Full code; GOC discussions ongoing with patient and spouse
Taking atorvastatin at home  - Resume home statin.

## 2023-09-25 NOTE — PROVIDER CONTACT NOTE (OTHER) - ACTION/TREATMENT ORDERED:
Awaiting AM CBC result
MD aware. No new orders made at this time. Care plan continues
No interventions presently.
MD said this is ok, no interventions to be done at this time. POC ongoing.
MD aware. No new orders made at this time. Care plan continues
MD aware. No new orders made at this time. Care plan continues
will continue to monitor, safety maintained
Yes

## 2023-09-25 NOTE — PROGRESS NOTE ADULT - PROBLEM SELECTOR PLAN 1
Patient with bright red blood per rectum for 1 day. One potential prior episode of bleeding after found blood on bedsheet ~2 months ago. Takes eliquis at home for a-fib. Baseline hemoglobin 11-12; 10.6 on admission.  - Hgb this AM of 10.3 (9/22); stable since admission.  - GI consulted; recs appreciated. No colonoscopy at this time.  - Smog enema ordered, bowel regimen with miralax and senna.  - Maintain active type and screen, transfuse if hgb <7. Maintain 2 large bore IVs.  - Trend CBC  - Anemia work up with B12, folate, haptoglobin, LDH, uric acid - unremarkable.

## 2023-09-25 NOTE — PROGRESS NOTE ADULT - PROBLEM SELECTOR PLAN 2
Osseous mets found on CT A/P with enlarged prostate as suspected source. Notes 20-30 lb weight loss over past 2 months.  - F/u PSA - 345  - [facility] discussion initiated with family; patient and spouse would like to pursue biopsy at this time.  - CT-guided bone biopsy planned for Monday 9/25 per IR. Osseous mets found on CT A/P with enlarged prostate as suspected source. Notes 20-30 lb weight loss over past 2 months.  - F/u PSA - 345  - [facility] discussion initiated with family; patient and spouse would like to pursue biopsy at this time.  - CT-guided bone biopsy planned for 9/25 per IR.

## 2023-09-25 NOTE — PROGRESS NOTE ADULT - PROBLEM SELECTOR PLAN 4
Patient taking eliquis and coreg at home.  - Hold home meds in the setting of active LGIB.
Taking losartan-HCTZ at home.  - Hold home meds in the setting of active LGIB.
Patient taking eliquis and coreg at home.  - Hold home meds in the setting of active LGIB.

## 2023-09-25 NOTE — PROGRESS NOTE ADULT - PROBLEM SELECTOR PROBLEM 3
Dental implant pain
Dental implant pain
Atrial fibrillation
Dental implant pain

## 2023-09-25 NOTE — PROGRESS NOTE ADULT - ASSESSMENT
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 CT on admission revealed diffuse osseous mets, with an enlarged prostate as the potential source. Pending C discussion with patient and spouse to decide treatment vs comfort care; at this time they would like to pursue biopsy, potentially planned for Monday with IR. 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 CT on admission revealed diffuse osseous mets, with an enlarged prostate as the potential source. Pending C discussion with patient and spouse to decide treatment vs comfort care; at this time they would like to pursue biopsy, potentially planned for today with IR.

## 2023-09-25 NOTE — PROGRESS NOTE ADULT - PROBLEM SELECTOR PROBLEM 1
Bleeding per rectum

## 2023-09-25 NOTE — PROVIDER CONTACT NOTE (OTHER) - ASSESSMENT
Pt had a small red streak BM this am
Pt is asymptomatic and otherwise stable
pt with  bp 101/54, asymptomatic
pt with /52 96%, 60HR, 97.4 . Asymptomatic. Denies chest pain, SOB, dizziness, or lightheadedness. States he is just "feeling tired"
pt with BP 93/48, 59HR, 97%. Asymptomatic. Denies chest pain, SOB, dizziness, or lightheadedness.
pt with BP 95/56, 60HR, 97%. Asymptomatic. Denies chest pain, SOB, dizziness, or lightheadedness.
Pt BP is 118/56 mmhg. Asymptomatic

## 2023-09-25 NOTE — PROVIDER CONTACT NOTE (OTHER) - REASON
Pt BP is 118/56 mmhg
pt with bp 95/56. asymptomatic
/52. asymptomatic
pt with bp 101/54, asymptomatic
pt with bp 93/48 asymptomatic
Pt had a small red streak BM this am
Pts BP is 101/52 at this time

## 2023-09-25 NOTE — PROGRESS NOTE ADULT - PROVIDER SPECIALTY LIST ADULT
Internal Medicine
Internal Medicine
Gastroenterology
Internal Medicine

## 2023-09-25 NOTE — PROGRESS NOTE ADULT - PROBLEM SELECTOR PROBLEM 4
Atrial fibrillation
Atrial fibrillation
Hypertension
Atrial fibrillation

## 2023-09-25 NOTE — PRE PROCEDURE NOTE - PRE PROCEDURE EVALUATION
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 CT on admission revealed diffuse osseous mets, with an enlarged prostate as the potential source. IR consulted for metastatic bone lesion biopsy.    Allergies: No Known Allergies    Medications (Abx/Cardiac/Anticoagulation/Blood Products)    enoxaparin Injectable: 40 milliGRAM(s) SubCutaneous (09-23 @ 11:42)    Data:    T(C): 36.4  HR: 78  BP: 122/64  RR: 18  SpO2: 100%    Exam  General: No acute distress  Chest: Non labored breathing  Abdomen: Non-distended  Extremities: No swelling, warm    -WBC 15.94 / HgB 11.3 / Hct 35.2 / Plt 359  -Na 135 / Cl 100 / BUN 19 / Glucose 85  -K 4.4 / CO2 28 / Cr 0.74  -ALT 11 / Alk Phos 451 / T.Bili 0.4  -INR1.05    Imaging:   - CTAP 9/18/23 reviewed     Plan:     -- IR will plan for CT guided bone lesion biopsy   -- Relevant imaging and labs were reviewed.   -- Risks, benefits, and alternatives were explained to the patient and informed consent was obtained.

## 2023-09-25 NOTE — PROVIDER CONTACT NOTE (OTHER) - BACKGROUND
admitted for melena
Pt was admitted for melena
Pt was admitted with yayo
admitted for melena
Pt was admitted with melena

## 2023-09-25 NOTE — PROVIDER CONTACT NOTE (OTHER) - RECOMMENDATIONS
MD notified.
Continue to monitor pt
Efraín Velasquez t3 notified, no interventions at this time
Awaiting AM CBC result
Monitor
MD notified.
MD notified.

## 2023-09-25 NOTE — PROGRESS NOTE ADULT - PROBLEM SELECTOR PLAN 5
Taking losartan-HCTZ at home.  - Hold home meds in the setting of active LGIB.
Taking atorvastatin at home  - Resume home statin.
Taking losartan-HCTZ at home.  - Hold home meds in the setting of active LGIB.

## 2023-09-25 NOTE — PROGRESS NOTE ADULT - ATTENDING COMMENTS
88M w/ HTN, CAD s/p stent (2012), AF (apixaban) s/p PPM, HFrEF (EF 30-25%), hemorrhoids presenting with BRBPR x 1 day as well as ongoing fatigue, nocturia, and 30 lb unintentional weight loss over the past several months found to have CT chest/abdomen/pelvis with stercoral colitis, heterogenous liver, enlarged, heterogeneous prostate and diffuse osseous metastases, and elevated PSA concerning for metastatic prostate cancer, s/p IR biopsy of bone lesion on 9/25.    Plan to discharge home this evening    - Oncology/[facility] to follow-up bx result, patient may prefer to seek outpatient oncology care closer to home in [place]  - Continue bowel regimen for stercoral colitis    Discussed with patient and his wife at bedside  Discussed with interdisciplinary team at [facility] 88M w/ HTN, CAD s/p stent (2012), AF (apixaban) s/p PPM, HFrEF (EF 30-25%), hemorrhoids presenting with BRBPR x 1 day as well as ongoing fatigue, nocturia, and 30 lb unintentional weight loss over the past several months found to have CT chest/abdomen/pelvis with stercoral colitis, heterogenous liver, enlarged, heterogeneous prostate and diffuse osseous metastases, and elevated PSA concerning for metastatic prostate cancer, s/p IR biopsy of bone lesion on 9/25.    Plan to discharge home this evening    - Oncology/[facility] to follow-up bx result, patient may prefer to seek outpatient oncology care closer to home in [place]  - Continue bowel regimen for stercoral colitis    Discussed with patient and his wife at bedside  Discussed with interdisciplinary team at [facility].

## 2023-09-25 NOTE — DISCHARGE NOTE NURSING/CASE MANAGEMENT/SOCIAL WORK - PATIENT PORTAL LINK FT
You can access the FollowMyHealth Patient Portal offered by [facility] by registering at the following website: [email or web address]. By joining [employer]’s FollowMyHealth portal, you will also be able to view your health information using other applications (apps) compatible with our system.

## 2023-10-04 PROBLEM — I10 HTN (HYPERTENSION): Status: ACTIVE | Noted: 2019-01-23

## 2023-10-04 PROBLEM — E55.9 VITAMIN D DEFICIENCY: Status: ACTIVE | Noted: 2018-09-08

## 2023-10-04 PROBLEM — Z09 HOSPITAL DISCHARGE FOLLOW-UP: Status: ACTIVE | Noted: 2023-01-01

## 2023-10-04 PROBLEM — I34.0 NONRHEUMATIC MITRAL (VALVE) INSUFFICIENCY: Status: ACTIVE | Noted: 2018-09-08

## 2023-10-04 PROBLEM — K92.2 GI BLEED: Status: ACTIVE | Noted: 2023-01-01

## 2023-10-04 PROBLEM — I65.21 ARTERIOSCLEROSIS OF CAROTID ARTERY, RIGHT: Status: ACTIVE | Noted: 2018-09-08

## 2023-10-04 PROBLEM — I42.9 CARDIOMYOPATHY: Status: ACTIVE | Noted: 2018-09-08

## 2023-11-16 ENCOUNTER — APPOINTMENT (OUTPATIENT)
Dept: HEART AND VASCULAR | Facility: CLINIC | Age: 88
End: 2023-11-16

## 2023-11-30 ENCOUNTER — APPOINTMENT (OUTPATIENT)
Dept: HEART AND VASCULAR | Facility: CLINIC | Age: 88
End: 2023-11-30

## 2024-02-01 ENCOUNTER — APPOINTMENT (OUTPATIENT)
Dept: HEART AND VASCULAR | Facility: CLINIC | Age: 89
End: 2024-02-01

## 2024-03-26 NOTE — PROCEDURE NOTE - IR COMPLICATIONS
Last visit with Lanette Hoyt MD: 10/16/2023  Last visit in [facility] OPHTHALMOLOGY: 10/16/2023    Patient's next visit in [facility] OPHTHALMOLOGY: 10/17/2024    Please advise.   No complications